# Patient Record
Sex: FEMALE | Race: WHITE
[De-identification: names, ages, dates, MRNs, and addresses within clinical notes are randomized per-mention and may not be internally consistent; named-entity substitution may affect disease eponyms.]

---

## 2022-11-16 ENCOUNTER — HOSPITAL ENCOUNTER (INPATIENT)
Dept: HOSPITAL 75 - ER | Age: 72
LOS: 6 days | Discharge: HOME | DRG: 373 | End: 2022-11-22
Attending: SURGERY | Admitting: SURGERY
Payer: MEDICARE

## 2022-11-16 VITALS — BODY MASS INDEX: 24.88 KG/M2 | WEIGHT: 145.73 LBS | HEIGHT: 63.98 IN

## 2022-11-16 VITALS — SYSTOLIC BLOOD PRESSURE: 119 MMHG | DIASTOLIC BLOOD PRESSURE: 57 MMHG

## 2022-11-16 VITALS — DIASTOLIC BLOOD PRESSURE: 62 MMHG | SYSTOLIC BLOOD PRESSURE: 135 MMHG

## 2022-11-16 VITALS — DIASTOLIC BLOOD PRESSURE: 58 MMHG | SYSTOLIC BLOOD PRESSURE: 130 MMHG

## 2022-11-16 VITALS — DIASTOLIC BLOOD PRESSURE: 56 MMHG | SYSTOLIC BLOOD PRESSURE: 124 MMHG

## 2022-11-16 VITALS — DIASTOLIC BLOOD PRESSURE: 55 MMHG | SYSTOLIC BLOOD PRESSURE: 116 MMHG

## 2022-11-16 DIAGNOSIS — Z87.891: ICD-10-CM

## 2022-11-16 DIAGNOSIS — I10: ICD-10-CM

## 2022-11-16 DIAGNOSIS — K35.33: Primary | ICD-10-CM

## 2022-11-16 DIAGNOSIS — F03.C0: ICD-10-CM

## 2022-11-16 DIAGNOSIS — E87.8: ICD-10-CM

## 2022-11-16 DIAGNOSIS — E78.5: ICD-10-CM

## 2022-11-16 DIAGNOSIS — Z20.822: ICD-10-CM

## 2022-11-16 LAB
ALBUMIN SERPL-MCNC: 4.3 GM/DL (ref 3.2–4.5)
ALP SERPL-CCNC: 54 U/L (ref 40–136)
ALT SERPL-CCNC: 14 U/L (ref 0–55)
AMYLASE SERPL-CCNC: 86 U/L (ref 25–125)
APTT BLD: 32 SEC (ref 24–35)
BASOPHILS # BLD AUTO: 0 10^3/UL (ref 0–0.1)
BASOPHILS NFR BLD AUTO: 0 % (ref 0–10)
BASOPHILS NFR BLD MANUAL: 0 %
BILIRUB SERPL-MCNC: 0.8 MG/DL (ref 0.1–1)
BUN/CREAT SERPL: 28
CALCIUM SERPL-MCNC: 10.5 MG/DL (ref 8.5–10.1)
CHLORIDE SERPL-SCNC: 98 MMOL/L (ref 98–107)
CK MB SERPL-MCNC: 3.5 NG/ML (ref ?–6.6)
CK SERPL-CCNC: 165 U/L (ref 29–168)
CO2 SERPL-SCNC: 21 MMOL/L (ref 21–32)
CREAT SERPL-MCNC: 1.37 MG/DL (ref 0.6–1.3)
D DIMER PPP FEU-MCNC: 1.27 UG/ML (ref 0–0.49)
EOSINOPHIL # BLD AUTO: 0.2 10^3/UL (ref 0–0.3)
EOSINOPHIL NFR BLD AUTO: 1 % (ref 0–10)
EOSINOPHIL NFR BLD MANUAL: 1 %
ERYTHROCYTE [SEDIMENTATION RATE] IN BLOOD: 19 MM/HR (ref 0–30)
FIBRINOGEN PPP-MCNC: 405 MG/DL (ref 221–496)
GFR SERPLBLD BASED ON 1.73 SQ M-ARVRAT: 41 ML/MIN
GLUCOSE SERPL-MCNC: 143 MG/DL (ref 70–105)
HCT VFR BLD CALC: 31 % (ref 35–52)
HGB BLD-MCNC: 10.6 G/DL (ref 11.5–16)
INR PPP: 1.2 (ref 0.8–1.4)
LIPASE SERPL-CCNC: 13 U/L (ref 8–78)
LYMPHOCYTES # BLD AUTO: 0.6 10^3/UL (ref 1–4)
LYMPHOCYTES NFR BLD AUTO: 5 % (ref 12–44)
MAGNESIUM SERPL-MCNC: 1.8 MG/DL (ref 1.6–2.4)
MANUAL DIFFERENTIAL PERFORMED BLD QL: YES
MCH RBC QN AUTO: 31 PG (ref 25–34)
MCHC RBC AUTO-ENTMCNC: 34 G/DL (ref 32–36)
MCV RBC AUTO: 91 FL (ref 80–99)
MONOCYTES # BLD AUTO: 0.3 10^3/UL (ref 0–1)
MONOCYTES NFR BLD AUTO: 3 % (ref 0–12)
MONOCYTES NFR BLD: 2 %
NEUTROPHILS # BLD AUTO: 11.1 10^3/UL (ref 1.8–7.8)
NEUTROPHILS NFR BLD AUTO: 91 % (ref 42–75)
NEUTS BAND NFR BLD MANUAL: 81 %
NEUTS BAND NFR BLD: 10 %
PLATELET # BLD: 211 10^3/UL (ref 130–400)
PMV BLD AUTO: 8.9 FL (ref 9–12.2)
POTASSIUM SERPL-SCNC: 4.3 MMOL/L (ref 3.6–5)
PROT SERPL-MCNC: 7.8 GM/DL (ref 6.4–8.2)
PROTHROMBIN TIME: 15.4 SEC (ref 12.2–14.7)
RBC MORPH BLD: NORMAL
SODIUM SERPL-SCNC: 132 MMOL/L (ref 135–145)
VARIANT LYMPHS NFR BLD MANUAL: 6 %
WBC # BLD AUTO: 12.2 10^3/UL (ref 4.3–11)

## 2022-11-16 PROCEDURE — 83735 ASSAY OF MAGNESIUM: CPT

## 2022-11-16 PROCEDURE — 85610 PROTHROMBIN TIME: CPT

## 2022-11-16 PROCEDURE — 71275 CT ANGIOGRAPHY CHEST: CPT

## 2022-11-16 PROCEDURE — 86901 BLOOD TYPING SEROLOGIC RH(D): CPT

## 2022-11-16 PROCEDURE — 84484 ASSAY OF TROPONIN QUANT: CPT

## 2022-11-16 PROCEDURE — 86850 RBC ANTIBODY SCREEN: CPT

## 2022-11-16 PROCEDURE — 82274 ASSAY TEST FOR BLOOD FECAL: CPT

## 2022-11-16 PROCEDURE — 77012 CT SCAN FOR NEEDLE BIOPSY: CPT

## 2022-11-16 PROCEDURE — 87636 SARSCOV2 & INF A&B AMP PRB: CPT

## 2022-11-16 PROCEDURE — 87076 CULTURE ANAEROBE IDENT EACH: CPT

## 2022-11-16 PROCEDURE — 80048 BASIC METABOLIC PNL TOTAL CA: CPT

## 2022-11-16 PROCEDURE — 87070 CULTURE OTHR SPECIMN AEROBIC: CPT

## 2022-11-16 PROCEDURE — 85007 BL SMEAR W/DIFF WBC COUNT: CPT

## 2022-11-16 PROCEDURE — 86920 COMPATIBILITY TEST SPIN: CPT

## 2022-11-16 PROCEDURE — 82550 ASSAY OF CK (CPK): CPT

## 2022-11-16 PROCEDURE — 99156 MOD SED OTH PHYS/QHP 5/>YRS: CPT

## 2022-11-16 PROCEDURE — 71045 X-RAY EXAM CHEST 1 VIEW: CPT

## 2022-11-16 PROCEDURE — 93041 RHYTHM ECG TRACING: CPT

## 2022-11-16 PROCEDURE — 85384 FIBRINOGEN ACTIVITY: CPT

## 2022-11-16 PROCEDURE — 87205 SMEAR GRAM STAIN: CPT

## 2022-11-16 PROCEDURE — 87075 CULTR BACTERIA EXCEPT BLOOD: CPT

## 2022-11-16 PROCEDURE — 87077 CULTURE AEROBIC IDENTIFY: CPT

## 2022-11-16 PROCEDURE — 74178 CT ABD&PLV WO CNTR FLWD CNTR: CPT

## 2022-11-16 PROCEDURE — 85027 COMPLETE CBC AUTOMATED: CPT

## 2022-11-16 PROCEDURE — 85652 RBC SED RATE AUTOMATED: CPT

## 2022-11-16 PROCEDURE — 80053 COMPREHEN METABOLIC PANEL: CPT

## 2022-11-16 PROCEDURE — 85379 FIBRIN DEGRADATION QUANT: CPT

## 2022-11-16 PROCEDURE — 87116 MYCOBACTERIA CULTURE: CPT

## 2022-11-16 PROCEDURE — 93005 ELECTROCARDIOGRAM TRACING: CPT

## 2022-11-16 PROCEDURE — 87206 SMEAR FLUORESCENT/ACID STAI: CPT

## 2022-11-16 PROCEDURE — 86141 C-REACTIVE PROTEIN HS: CPT

## 2022-11-16 PROCEDURE — 74174 CTA ABD&PLVS W/CONTRAST: CPT

## 2022-11-16 PROCEDURE — 36415 COLL VENOUS BLD VENIPUNCTURE: CPT

## 2022-11-16 PROCEDURE — 83690 ASSAY OF LIPASE: CPT

## 2022-11-16 PROCEDURE — 85730 THROMBOPLASTIN TIME PARTIAL: CPT

## 2022-11-16 PROCEDURE — 82150 ASSAY OF AMYLASE: CPT

## 2022-11-16 PROCEDURE — 82553 CREATINE MB FRACTION: CPT

## 2022-11-16 PROCEDURE — 85025 COMPLETE CBC W/AUTO DIFF WBC: CPT

## 2022-11-16 PROCEDURE — 86900 BLOOD TYPING SEROLOGIC ABO: CPT

## 2022-11-16 RX ADMIN — SODIUM CHLORIDE SCH MLS/HR: 900 INJECTION INTRAVENOUS at 17:08

## 2022-11-16 RX ADMIN — MORPHINE SULFATE PRN MG: 4 INJECTION, SOLUTION INTRAMUSCULAR; INTRAVENOUS at 15:55

## 2022-11-16 NOTE — DIAGNOSTIC IMAGING REPORT
PROCEDURE: CT angiography of the chest with contrast and CT

abdomen and pelvis with contrast.



TECHNIQUE: Multiple contiguous axial images were obtained through

the chest, abdomen and pelvis after administration of intravenous

contrast. 3D MIP reconstructed CT  angiography acquisitions of

the aorta were then performed. Auto Exposure Controls were

utilized during the CT exam to meet ALARA standards for radiation

dose reduction. 



INDICATION:  Abdominal pain.



No prior studies are available for comparison.



CT angiogram chest:



The thoracic aorta is of normal caliber. No dissection is

identified. Central pulmonary arteries are patent. Is no

pericardial or pleural fluid identified. Biapical

pleural-parenchymal scarring is noted. There are centrilobular

emphysematous changes. No infiltrates or pulmonary masses are

detected. There is a mass right lobe of the thyroid approximately

2.5 cm in size.



IMPRESSION: 

Chronic parenchymal changes. No acute abnormality in the chest is

identified. There is no evidence of a thoracic aortic dissection.





Thyroid mass. Dedicated thyroid ultrasound on a nonemergent basis

could be performed for further characterization.



CT abdomen and pelvis:



No focal liver mass is detected. Gallbladder is unremarkable.

There is no biliary duct dilatation. Pancreas and spleen are

unremarkable. No adrenal mass is identified. Right kidneys

unremarkable. Left kidney contains a cyst in the upper pole

measuring 4.3 cm. Bowel loops are normal caliber. The appendix is

dilated and thick-walled with periappendiceal inflammation

consistent with acute appendicitis. There is some small amount of

perihepatic free fluid. There appears to be some ill-defined

fluid in the right lower quadrant and features are concerning for

a perforated appendicitis. No drainable abscess is identified at

this time. There is no free air. Bladder and uterus are

unremarkable.



IMPRESSION: Features consistent with acute appendicitis. There is

some free fluid in the right lower quadrant and features are

concerning for perforated appendicitis. No bowel obstruction is

identified. 



Moderate stool consistent with constipation.



Left renal cyst.



Dictated by: 



  Dictated on workstation # [id number]
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
## 2022-11-16 NOTE — CONSULTATION REPORT
HISTORY OF PRESENT ILLNESS:

The patient is a 72-year-old female who was brought to the Emergency Department 

by her  for abdominal pain.  The patient does have a history of dementia;

however, the patient's  states that she is a relatively functional.  She 

can communicate at times she is able to ambulate on her own and goes to the 

kitchen as well as a bathroom on her own.  She also is able to eat without any 

difficulty.  They report that she developed lower abdominal pain a few weeks 

ago.  She underwent workup at a different institution including a CT scan and 

laboratory work, which did show what he believes was colitis and was placed on 

antibiotics at home.  They state that the pain did resolve over time; however, 

she had recurrent lower crampy abdominal pain starting yesterday, they report 

that this was slightly more intense in quality.  They do not report any nausea 

or vomiting as well as no diarrhea.  Again, upon further questioning, the 

 reports that she has had this pain in the lower abdominal quadrants now 

for approximately 2 weeks.  A CT scan was performed, which did show a perforated

appendicitis.  This is likely a chronic process.  She is afebrile with stable 

vital signs and her white count is 12,000.  She is most likely in the organizing

phase of an inflammatory phlegmon.



PAST MEDICAL HISTORY:

Dementia, hypertension, hypercholesterolemia.



PAST SURGICAL HISTORY:

Breast biopsy.



ALLERGIES:

NO KNOWN DRUG ALLERGIES.



MEDICATIONS:

Donepezil 5 mg daily, lisinopril/hydrochlorothiazide 20/12.5 mg b.i.d., 

spironolactone 25 mg daily.



SOCIAL HISTORY:

Previous smoker, quit 1991, 30 pack years.  Social alcohol, quit same year.



FAMILY HISTORY:

Brother lymphoma.  Another brother with esophageal cancer.



VITAL SIGNS:

Temperature 37.0, blood pressure 135/62, pulse 88, respirations 16, pulse ox 

100% on 1 liter nasal cannula.



REVIEW OF SYSTEMS:

GENERAL:  This is an elderly female who is currently sleeping and does not 

appear to be in any acute distress.  She is not experiencing any shortness of 

breath or difficulty breathing.

RESPIRATORY:  No cough or sputum production.  No chest pain.

CARDIAC:  No palpitations, no diaphoresis.

GASTROINTESTINAL:  No nausea, vomiting with slightly looser stools, no known red

blood per rectum, nor any dark tarry stools.  No fever or chills.  No recent 

inadvertent weight loss.  All other review of systems negative.



PHYSICAL EXAMINATION:

CHEST:  Clear.  Good breath sounds bilaterally.

HEART:  Regular, no murmurs.

EXTREMITIES:  No lower extremity edema.  Negative Homans sign.

HEENT:  No scleral icterus.  No cervical lymphadenopathy.

ABDOMEN:  Soft, nondistended.  There is pain in the right lower abdominal 

quadrant with voluntary guarding, no rebound.

SKIN:  Warm and dry.



LABORATORY DATA:  WBC 12.2, hemoglobin 10.6, hematocrit 31, platelets 211, BUN 

38, creatinine 1.37.  Liver function enzymes normal.  Amylase and lipase normal.



ASSESSMENT AND PLAN:

A 72-year-old female with a perforated appendicitis.  This appears to be chronic

and now in the organizing phase of an inflammatory phlegmon.  Due to the stage 

of this perforated appendicitis, our recommendation is to proceed with 

conservative management with IV antibiotics and monitoring her vital signs.  She

may need a repeat CT scan to see if any drainable abscess does develop.  Once 
she 

is feeling better and the pain has subsided and she is ambulating well, 

tolerating a diet and having normal bowel movements, she will then be discharged

home with oral antibiotics and then we will have her up and in approximately 6 

weeks, we would then proceed with a formal appendectomy to prevent reoccurrence 

of this issue.  For now, we will start a diet and advanced as tolerated and 

continue with the IV antibiotics and repeat labs in the a.m.





Job ID: [id number]

DocumentID: [id number]

Dictated Date: 11/16/2022 17:57:14

Transcription Date: 11/16/2022 18:53:00

Dictated By: SHANI GUO MD

MTDD

## 2022-11-16 NOTE — DIAGNOSTIC IMAGING REPORT
EXAMINATION: Chest radiograph, portable AP view.



DATE: 11/16/2022 4:35 AM



INDICATION: 72-year-old female, chest pain.



COMPARISON:  None.



FINDINGS: Heart size and mediastinal contours are unremarkable.

There is no identified pneumothorax. There is no large pleural

effusion. There is no identified focal airspace consolidation.



IMPRESSION: 

1. No identified acute cardiopulmonary abnormality.



Dictated by: 



  Dictated on workstation # [id number]

## 2022-11-17 VITALS — SYSTOLIC BLOOD PRESSURE: 152 MMHG | DIASTOLIC BLOOD PRESSURE: 65 MMHG

## 2022-11-17 VITALS — DIASTOLIC BLOOD PRESSURE: 70 MMHG | SYSTOLIC BLOOD PRESSURE: 164 MMHG

## 2022-11-17 VITALS — DIASTOLIC BLOOD PRESSURE: 64 MMHG | SYSTOLIC BLOOD PRESSURE: 152 MMHG

## 2022-11-17 VITALS — DIASTOLIC BLOOD PRESSURE: 68 MMHG | SYSTOLIC BLOOD PRESSURE: 148 MMHG

## 2022-11-17 VITALS — SYSTOLIC BLOOD PRESSURE: 148 MMHG | DIASTOLIC BLOOD PRESSURE: 67 MMHG

## 2022-11-17 LAB
BASOPHILS # BLD AUTO: 0 10^3/UL (ref 0–0.1)
BASOPHILS NFR BLD AUTO: 0 % (ref 0–10)
BUN/CREAT SERPL: 22
CALCIUM SERPL-MCNC: 9.9 MG/DL (ref 8.5–10.1)
CHLORIDE SERPL-SCNC: 105 MMOL/L (ref 98–107)
CO2 SERPL-SCNC: 21 MMOL/L (ref 21–32)
CREAT SERPL-MCNC: 0.97 MG/DL (ref 0.6–1.3)
EOSINOPHIL # BLD AUTO: 0.1 10^3/UL (ref 0–0.3)
EOSINOPHIL NFR BLD AUTO: 1 % (ref 0–10)
GFR SERPLBLD BASED ON 1.73 SQ M-ARVRAT: 61 ML/MIN
GLUCOSE SERPL-MCNC: 125 MG/DL (ref 70–105)
HCT VFR BLD CALC: 30 % (ref 35–52)
HGB BLD-MCNC: 10.1 G/DL (ref 11.5–16)
LYMPHOCYTES # BLD AUTO: 0.8 10^3/UL (ref 1–4)
LYMPHOCYTES NFR BLD AUTO: 6 % (ref 12–44)
MANUAL DIFFERENTIAL PERFORMED BLD QL: NO
MCH RBC QN AUTO: 31 PG (ref 25–34)
MCHC RBC AUTO-ENTMCNC: 34 G/DL (ref 32–36)
MCV RBC AUTO: 91 FL (ref 80–99)
MONOCYTES # BLD AUTO: 0.5 10^3/UL (ref 0–1)
MONOCYTES NFR BLD AUTO: 4 % (ref 0–12)
NEUTROPHILS # BLD AUTO: 12.7 10^3/UL (ref 1.8–7.8)
NEUTROPHILS NFR BLD AUTO: 89 % (ref 42–75)
PLATELET # BLD: 197 10^3/UL (ref 130–400)
PMV BLD AUTO: 8.9 FL (ref 9–12.2)
POTASSIUM SERPL-SCNC: 3.7 MMOL/L (ref 3.6–5)
SODIUM SERPL-SCNC: 136 MMOL/L (ref 135–145)
WBC # BLD AUTO: 14.2 10^3/UL (ref 4.3–11)

## 2022-11-17 RX ADMIN — MORPHINE SULFATE PRN MG: 4 INJECTION, SOLUTION INTRAMUSCULAR; INTRAVENOUS at 20:22

## 2022-11-17 RX ADMIN — MORPHINE SULFATE PRN MG: 4 INJECTION, SOLUTION INTRAMUSCULAR; INTRAVENOUS at 16:51

## 2022-11-17 RX ADMIN — ENOXAPARIN SODIUM SCH MG: 100 INJECTION SUBCUTANEOUS at 15:03

## 2022-11-17 RX ADMIN — SODIUM CHLORIDE SCH MLS/HR: 900 INJECTION INTRAVENOUS at 17:43

## 2022-11-17 RX ADMIN — MORPHINE SULFATE PRN MG: 4 INJECTION, SOLUTION INTRAMUSCULAR; INTRAVENOUS at 12:15

## 2022-11-17 RX ADMIN — SODIUM CHLORIDE SCH MLS/HR: 900 INJECTION INTRAVENOUS at 09:08

## 2022-11-17 RX ADMIN — SODIUM CHLORIDE SCH MLS/HR: 4.5 INJECTION, SOLUTION INTRAVENOUS at 15:02

## 2022-11-17 RX ADMIN — SODIUM CHLORIDE SCH MLS/HR: 900 INJECTION INTRAVENOUS at 02:29

## 2022-11-17 NOTE — PROGRESS NOTE
Subjective


Date Seen by a Provider:  Nov 17, 2022


Time Seen by a Provider:  16:30


Subjective/Events-last exam


doing ok. tolerating liquids. pain controlled. no fever/chills.





Objective


Exam





Vital Signs








  Date Time  Temp Pulse Resp B/P (MAP) Pulse Ox O2 Delivery O2 Flow Rate FiO2


 


11/17/22 15:29 37.1 95 18 148/68 (94) 100 Room Air  


 


11/17/22 12:00 36.9 97 18 152/64 (93) 97   


 


11/17/22 08:00      Room Air  


 


11/17/22 08:00 37.5 99 18 148/67 (94)    


 


11/17/22 04:16 36.8 96 18 164/70 (101) 95 Room Air  


 


11/16/22 23:35 36.9 81 18 119/57 (77) 99 Room Air  


 


11/16/22 20:59      Room Air  


 


11/16/22 19:22 36.9 84 20 116/55 (75) 99 Nasal Cannula 1.00 














I & O 


 


 11/17/22





 07:00


 


Intake Total 0 ml


 


Balance 0 ml





Capillary Refill : Less Than 3 Seconds


General Appearance:  No Apparent Distress


HEENT:  PERRL/EOMI


Neck:  Full Range of Motion


Respiratory:  Chest Non Tender, Lungs Clear


Cardiovascular:  Regular Rate, Rhythm


Gastrointestinal:  normal bowel sounds, soft, tenderness


Extremity:  Normal Capillary Refill


Neurologic/Psychiatric:  Alert


Skin:  Normal Color


Lymphatic:  No Adenopathy





Results


Lab


Laboratory Tests


11/17/22 07:57: 


White Blood Count 14.2H, Red Blood Count 3.28L, Hemoglobin 10.1L, Hematocrit 30L

, Mean Corpuscular Volume 91, Mean Corpuscular Hemoglobin 31, Mean Corpuscular 

Hemoglobin Concent 34, Red Cell Distribution Width 13.9, Platelet Count 197, 

Mean Platelet Volume 8.9L, Immature Granulocyte % (Auto) 1, Neutrophils (%) 

(Auto) 89H, Lymphocytes (%) (Auto) 6L, Monocytes (%) (Auto) 4, Eosinophils (%) 

(Auto) 1, Basophils (%) (Auto) 0, Neutrophils # (Auto) 12.7H, Lymphocytes # 

(Auto) 0.8L, Monocytes # (Auto) 0.5, Eosinophils # (Auto) 0.1, Basophils # 

(Auto) 0.0, Immature Granulocyte # (Auto) 0.1, Sodium Level 136, Potassium Level

3.7, Chloride Level 105, Carbon Dioxide Level 21, Anion Gap 10, Blood Urea 

Nitrogen 21H, Creatinine 0.97, Estimat Glomerular Filtration Rate 61, 

BUN/Creatinine Ratio 22, Glucose Level 125H, Calcium Level 9.9





Assessment/Plan


Assessment/Plan


Assess & Plan/Chief Complaint


perforated appendicitis-chronic and organizing phase.


literature now recommends bowel rest and IV abx due to increased 

risk/complications with surgery.


will continue with conservative management with frequent clinical exams and 

labs.











SHANI GUO MD                Nov 17, 2022 17:00

## 2022-11-17 NOTE — PROGRESS NOTE - HOSPITALIST
Subjective


HPI/CC On Admission


Date Seen by Provider:  Nov 17, 2022


Pt is a 72yoCF with a PMH of advanced dementia, HTN, and HLD who presented to 

the ER due to abd pain. She is sleeping soundly after having received morphine 

and does not provide any history due to that and her advanced dementia.  

provides all of the history. He reports that she developed abd pain around 1800 

last night and was groaning. She also had poor oral intake yesterday. She had a 

similar episode back in August but was told it was her colon at that time and 

prescribed Flagyl and Cipro as an outpatient. Imaging today revealed acute 

appendicitis. She is admitted to surgery and I am consulted for medical 

management.


Subjective/Events-last exam


Pt more alert today. Still does not provide history.  states she's had 

onthing orally and is not interested in eating. Asks about he sodium level and 

what would be indicatoins for surgery. Deferred surgical deicsion making 

questions to Dr Osborne and informed patient of that. Discussed labs results and 

plan to start IVF with low oral intake. RN reports she pulled her IV out 

overnight.





Objective


Exam


Vital Signs





Vital Signs








  Date Time  Temp Pulse Resp B/P (MAP) Pulse Ox O2 Delivery O2 Flow Rate FiO2


 


11/17/22 12:00 36.9 97 18 152/64 (93) 97   


 


11/17/22 08:00      Room Air  


 


11/16/22 19:22       1.00 





Capillary Refill : Less Than 3 Seconds


General Appearance:  WD/WN, Chronically ill


Cardiovascular:  Regular Rate, Rhythm, No Murmur


Gastrointestinal:  Normal Bowel Sounds; No Distended; Tenderness (improved but 

still on right side with palpation)


Neurologic/Psychiatric:  Alert





Results/Procedures


Lab


Laboratory Tests


11/17/22 07:57








Patient resulted labs reviewed.


Imaging:  Reviewed Imaging Report





Assessment/Plan


Assessment and Plan


Assess & Plan/Chief Complaint


Acute appendicitis with questionable perforation


Advanced Dementia


   Management per surgery- note states plan to defer surgery for a few weeks and

treat with IV abx


   Pain regimen


   IVF ordered





HTN


HLD


   BP trended up, resume lisinopril


   trend


   No acute needs





DVT ppx: Lovenox











ROVERTO FIORE MD              Nov 17, 2022 14:37

## 2022-11-18 VITALS — DIASTOLIC BLOOD PRESSURE: 90 MMHG | SYSTOLIC BLOOD PRESSURE: 155 MMHG

## 2022-11-18 VITALS — SYSTOLIC BLOOD PRESSURE: 181 MMHG | DIASTOLIC BLOOD PRESSURE: 74 MMHG

## 2022-11-18 VITALS — SYSTOLIC BLOOD PRESSURE: 166 MMHG | DIASTOLIC BLOOD PRESSURE: 72 MMHG

## 2022-11-18 VITALS — DIASTOLIC BLOOD PRESSURE: 81 MMHG | SYSTOLIC BLOOD PRESSURE: 184 MMHG

## 2022-11-18 VITALS — SYSTOLIC BLOOD PRESSURE: 166 MMHG | DIASTOLIC BLOOD PRESSURE: 80 MMHG

## 2022-11-18 VITALS — DIASTOLIC BLOOD PRESSURE: 75 MMHG | SYSTOLIC BLOOD PRESSURE: 182 MMHG

## 2022-11-18 VITALS — SYSTOLIC BLOOD PRESSURE: 122 MMHG | DIASTOLIC BLOOD PRESSURE: 65 MMHG

## 2022-11-18 LAB
BUN/CREAT SERPL: 21
CALCIUM SERPL-MCNC: 9.2 MG/DL (ref 8.5–10.1)
CHLORIDE SERPL-SCNC: 105 MMOL/L (ref 98–107)
CO2 SERPL-SCNC: 19 MMOL/L (ref 21–32)
CREAT SERPL-MCNC: 0.86 MG/DL (ref 0.6–1.3)
GFR SERPLBLD BASED ON 1.73 SQ M-ARVRAT: 71 ML/MIN
GLUCOSE SERPL-MCNC: 108 MG/DL (ref 70–105)
HCT VFR BLD CALC: 28 % (ref 35–52)
HGB BLD-MCNC: 9.4 G/DL (ref 11.5–16)
MCH RBC QN AUTO: 31 PG (ref 25–34)
MCHC RBC AUTO-ENTMCNC: 34 G/DL (ref 32–36)
MCV RBC AUTO: 91 FL (ref 80–99)
PLATELET # BLD: 214 10^3/UL (ref 130–400)
PMV BLD AUTO: 9.3 FL (ref 9–12.2)
POTASSIUM SERPL-SCNC: 3.5 MMOL/L (ref 3.6–5)
SODIUM SERPL-SCNC: 135 MMOL/L (ref 135–145)
WBC # BLD AUTO: 13.6 10^3/UL (ref 4.3–11)

## 2022-11-18 RX ADMIN — SODIUM CHLORIDE SCH MLS/HR: 4.5 INJECTION, SOLUTION INTRAVENOUS at 20:17

## 2022-11-18 RX ADMIN — LISINOPRIL SCH MG: 20 TABLET ORAL at 05:25

## 2022-11-18 RX ADMIN — SPIRONOLACTONE SCH MG: 25 TABLET ORAL at 08:19

## 2022-11-18 RX ADMIN — SODIUM CHLORIDE SCH MLS/HR: 900 INJECTION INTRAVENOUS at 09:05

## 2022-11-18 RX ADMIN — DONEPEZIL HYDROCHLORIDE SCH MG: 5 TABLET, FILM COATED ORAL at 08:19

## 2022-11-18 RX ADMIN — SODIUM CHLORIDE SCH MLS/HR: 900 INJECTION INTRAVENOUS at 17:14

## 2022-11-18 RX ADMIN — MORPHINE SULFATE PRN MG: 4 INJECTION, SOLUTION INTRAMUSCULAR; INTRAVENOUS at 04:50

## 2022-11-18 RX ADMIN — SODIUM CHLORIDE SCH MLS/HR: 4.5 INJECTION, SOLUTION INTRAVENOUS at 10:46

## 2022-11-18 RX ADMIN — HYDROCODONE BITARTRATE AND ACETAMINOPHEN PRN EA: 5; 325 TABLET ORAL at 17:14

## 2022-11-18 RX ADMIN — ENOXAPARIN SODIUM SCH MG: 100 INJECTION SUBCUTANEOUS at 15:25

## 2022-11-18 RX ADMIN — SODIUM CHLORIDE SCH MLS/HR: 4.5 INJECTION, SOLUTION INTRAVENOUS at 00:41

## 2022-11-18 RX ADMIN — SODIUM CHLORIDE SCH MLS/HR: 900 INJECTION INTRAVENOUS at 01:42

## 2022-11-18 RX ADMIN — LISINOPRIL SCH MG: 20 TABLET ORAL at 20:17

## 2022-11-18 NOTE — PHYSICAL THERAPY PROGRESS NOTE
Therapy Progress Note


Order received for PT evaluation.  Patient is currently will not wake up.  

 cannot wake her.  Patient has advanced dementia.   says she is 

usually more awake in the morning.  Will check back tomorrow morning.











DYLON MAZARIEGOS PT                Nov 18, 2022 13:32

## 2022-11-18 NOTE — PROGRESS NOTE - HOSPITALIST
Subjective


HPI/CC On Admission


Date Seen by Provider:  Nov 18, 2022


Pt is a 72yoCF with a PMH of advanced dementia, HTN, and HLD who presented to 

the ER due to abd pain. She is sleeping soundly after having received morphine 

and does not provide any history due to that and her advanced dementia.  

provides all of the history. He reports that she developed abd pain around 1800 

last night and was groaning. She also had poor oral intake yesterday. She had a 

similar episode back in August but was told it was her colon at that time and 

prescribed Flagyl and Cipro as an outpatient. Imaging today revealed acute 

appendicitis. She is admitted to surgery and I am consulted for medical 

management.


Subjective/Events-last exam


Pt remains drowsy but  states her mentation is much improved. He states 

she looked at him and seemed to smile and light up this morning. She also ate a 

big breakfast. He seems to think her pain is improved as well.





Objective


Exam


Vital Signs





Vital Signs








  Date Time  Temp Pulse Resp B/P (MAP) Pulse Ox O2 Delivery O2 Flow Rate FiO2


 


11/18/22 11:38 36.4 76 18 122/65 (84) 99 Room Air 0.00 





       0.00 





Capillary Refill : Less Than 3 Seconds


General Appearance:  No Apparent Distress, Chronically ill, Thin


Respiratory:  Lungs Clear, No Respiratory Distress


Cardiovascular:  Regular Rate, Rhythm, No Murmur


Gastrointestinal:  Normal Bowel Sounds, Non Tender, Soft


Neurologic/Psychiatric:  Alert, Disoriented





Results/Procedures


Lab


Laboratory Tests


11/18/22 05:21








Patient resulted labs reviewed.


Imaging:  Reviewed Imaging Report





Assessment/Plan


Assessment and Plan


Assess & Plan/Chief Complaint


Acute appendicitis with questionable perforation


Advanced Dementia


   Management per surgery- note states plan to defer surgery for a few weeks and

treat with IV abx


   Pain improving


   IVF ordered- if continues to eat well will DC later





HTN


HLD


   BP trended up, continue lisinopril


   trend


   No acute needs





DVT ppx: ROVERTO Leong MD              Nov 18, 2022 12:07

## 2022-11-18 NOTE — PROGRESS NOTE
Subjective


Date Seen by a Provider:  Nov 18, 2022


Time Seen by a Provider:  15:00


Subjective/Events-last exam


doing ok. pain controlled. tolerating clears. had a BM. no fever/chills.





Objective


Exam





Vital Signs








  Date Time  Temp Pulse Resp B/P (MAP) Pulse Ox O2 Delivery O2 Flow Rate FiO2


 


11/18/22 11:38 36.4 76 18 122/65 (84) 99 Room Air 0.00 





       0.00 


 


11/18/22 08:00      Room Air  


 


11/18/22 07:32 37.2 93 20 155/90 (111) 95 Room Air 0.00 





       0.00 


 


11/18/22 04:58 36.4 92 18 181/74 (109) 95 Room Air  


 


11/18/22 00:00 36.7 88 18 166/72 (103) 96 Room Air  


 


11/17/22 20:00      Room Air  


 


11/17/22 19:05 37.3 86 20 152/65 (94) 97 Room Air 0.00 


 


11/17/22 15:29 37.1 95 18 148/68 (94) 100 Room Air  














I & O 


 


 11/18/22





 07:00


 


Intake Total 255 ml


 


Balance 255 ml





Capillary Refill : Less Than 3 Seconds


General Appearance:  No Apparent Distress


HEENT:  PERRL/EOMI


Neck:  Full Range of Motion


Respiratory:  Chest Non Tender, Lungs Clear


Cardiovascular:  Regular Rate, Rhythm


Gastrointestinal:  normal bowel sounds, soft, tenderness


Extremity:  Normal Capillary Refill


Neurologic/Psychiatric:  Alert


Skin:  Normal Color


Lymphatic:  No Adenopathy





Results


Lab


Laboratory Tests


11/18/22 05:21: 


White Blood Count 13.6H, Red Blood Count 3.06L, Hemoglobin 9.4L, Hematocrit 28L,

Mean Corpuscular Volume 91, Mean Corpuscular Hemoglobin 31, Mean Corpuscular 

Hemoglobin Concent 34, Red Cell Distribution Width 13.7, Platelet Count 214, 

Mean Platelet Volume 9.3, Percent Immature Platelet Fraction 1.5, Sodium Level 

135, Potassium Level 3.5L, Chloride Level 105, Carbon Dioxide Level 19L, Anion 

Gap 11, Blood Urea Nitrogen 18, Creatinine 0.86, Estimat Glomerular Filtration 

Rate 71, BUN/Creatinine Ratio 21, Glucose Level 108H, Calcium Level 9.2





Assessment/Plan


Assessment/Plan


Assess & Plan/Chief Complaint


perforated appendicitis-chronic and organizing phase.


literature now recommends bowel rest and IV abx due to increased 

risk/complications with surgery.


will continue with conservative management with frequent clinical exams and 

labs.


will cont current management over weekend and get CT abd/pelvis on monday and if

no abscess and clinically doing well overall will d/c home.











SHANI GUO MD                Nov 18, 2022 15:17

## 2022-11-19 VITALS — SYSTOLIC BLOOD PRESSURE: 167 MMHG | DIASTOLIC BLOOD PRESSURE: 77 MMHG

## 2022-11-19 VITALS — SYSTOLIC BLOOD PRESSURE: 184 MMHG | DIASTOLIC BLOOD PRESSURE: 79 MMHG

## 2022-11-19 VITALS — SYSTOLIC BLOOD PRESSURE: 180 MMHG | DIASTOLIC BLOOD PRESSURE: 88 MMHG

## 2022-11-19 VITALS — SYSTOLIC BLOOD PRESSURE: 162 MMHG | DIASTOLIC BLOOD PRESSURE: 70 MMHG

## 2022-11-19 VITALS — SYSTOLIC BLOOD PRESSURE: 182 MMHG | DIASTOLIC BLOOD PRESSURE: 70 MMHG

## 2022-11-19 VITALS — SYSTOLIC BLOOD PRESSURE: 142 MMHG | DIASTOLIC BLOOD PRESSURE: 72 MMHG

## 2022-11-19 VITALS — SYSTOLIC BLOOD PRESSURE: 200 MMHG | DIASTOLIC BLOOD PRESSURE: 88 MMHG

## 2022-11-19 VITALS — SYSTOLIC BLOOD PRESSURE: 210 MMHG | DIASTOLIC BLOOD PRESSURE: 99 MMHG

## 2022-11-19 LAB
BASOPHILS # BLD AUTO: 0.1 10^3/UL (ref 0–0.1)
BASOPHILS NFR BLD AUTO: 0 % (ref 0–10)
EOSINOPHIL # BLD AUTO: 0.5 10^3/UL (ref 0–0.3)
EOSINOPHIL NFR BLD AUTO: 4 % (ref 0–10)
HCT VFR BLD CALC: 28 % (ref 35–52)
HGB BLD-MCNC: 9.4 G/DL (ref 11.5–16)
LYMPHOCYTES # BLD AUTO: 1.2 10^3/UL (ref 1–4)
LYMPHOCYTES NFR BLD AUTO: 10 % (ref 12–44)
MANUAL DIFFERENTIAL PERFORMED BLD QL: NO
MCH RBC QN AUTO: 31 PG (ref 25–34)
MCHC RBC AUTO-ENTMCNC: 34 G/DL (ref 32–36)
MCV RBC AUTO: 91 FL (ref 80–99)
MONOCYTES # BLD AUTO: 0.6 10^3/UL (ref 0–1)
MONOCYTES NFR BLD AUTO: 5 % (ref 0–12)
NEUTROPHILS # BLD AUTO: 9.5 10^3/UL (ref 1.8–7.8)
NEUTROPHILS NFR BLD AUTO: 80 % (ref 42–75)
PLATELET # BLD: 218 10^3/UL (ref 130–400)
PMV BLD AUTO: 9.4 FL (ref 9–12.2)
WBC # BLD AUTO: 11.9 10^3/UL (ref 4.3–11)

## 2022-11-19 RX ADMIN — SODIUM CHLORIDE SCH MLS/HR: 900 INJECTION INTRAVENOUS at 01:21

## 2022-11-19 RX ADMIN — MORPHINE SULFATE PRN MG: 4 INJECTION, SOLUTION INTRAMUSCULAR; INTRAVENOUS at 16:02

## 2022-11-19 RX ADMIN — HYDROCODONE BITARTRATE AND ACETAMINOPHEN PRN EA: 5; 325 TABLET ORAL at 20:15

## 2022-11-19 RX ADMIN — SPIRONOLACTONE SCH MG: 25 TABLET ORAL at 08:17

## 2022-11-19 RX ADMIN — SODIUM CHLORIDE SCH MLS/HR: 4.5 INJECTION, SOLUTION INTRAVENOUS at 06:12

## 2022-11-19 RX ADMIN — MORPHINE SULFATE PRN MG: 4 INJECTION, SOLUTION INTRAMUSCULAR; INTRAVENOUS at 01:20

## 2022-11-19 RX ADMIN — SODIUM CHLORIDE SCH MLS/HR: 900 INJECTION INTRAVENOUS at 10:42

## 2022-11-19 RX ADMIN — DONEPEZIL HYDROCHLORIDE SCH MG: 5 TABLET, FILM COATED ORAL at 08:16

## 2022-11-19 RX ADMIN — ENOXAPARIN SODIUM SCH MG: 100 INJECTION SUBCUTANEOUS at 16:02

## 2022-11-19 RX ADMIN — MORPHINE SULFATE PRN MG: 4 INJECTION, SOLUTION INTRAMUSCULAR; INTRAVENOUS at 11:11

## 2022-11-19 RX ADMIN — HYDRALAZINE HYDROCHLORIDE PRN MG: 20 INJECTION INTRAMUSCULAR; INTRAVENOUS at 10:42

## 2022-11-19 RX ADMIN — LISINOPRIL SCH MG: 20 TABLET ORAL at 08:16

## 2022-11-19 RX ADMIN — LISINOPRIL SCH MG: 20 TABLET ORAL at 20:12

## 2022-11-19 RX ADMIN — MORPHINE SULFATE PRN MG: 4 INJECTION, SOLUTION INTRAMUSCULAR; INTRAVENOUS at 14:35

## 2022-11-19 RX ADMIN — SODIUM CHLORIDE SCH MLS/HR: 900 INJECTION INTRAVENOUS at 17:10

## 2022-11-19 NOTE — PROGRESS NOTE - HOSPITALIST
Subjective


HPI/CC On Admission


Date Seen by Provider:  Nov 19, 2022


Pt is a 72yoCF with a PMH of advanced dementia, HTN, and HLD who presented to 

the ER due to abd pain. She is sleeping soundly after having received morphine 

and does not provide any history due to that and her advanced dementia.  

provides all of the history. He reports that she developed abd pain around 1800 

last night and was groaning. She also had poor oral intake yesterday. She had a 

similar episode back in August but was told it was her colon at that time and 

prescribed Flagyl and Cipro as an outpatient. Imaging today revealed acute 

appendicitis. She is admitted to surgery and I am consulted for medical 

management.


Subjective/Events-last exam


Pt much more alert today. Sitting up in chair. Ate breakfast well. Had elevated 

blood pressure this AM but  thinks it was due to pain.





Objective


Exam


Vital Signs





Vital Signs








  Date Time  Temp Pulse Resp B/P (MAP) Pulse Ox O2 Delivery O2 Flow Rate FiO2


 


11/19/22 09:14    182/70 (107)    


 


11/19/22 08:00     97 Room Air 0.00 


 


11/19/22 07:26 36.6 89 18     





Capillary Refill : Less Than 3 Seconds


General Appearance:  No Apparent Distress, Chronically ill


Respiratory:  Lungs Clear, No Respiratory Distress


Cardiovascular:  Regular Rate, Rhythm, No Murmur


Neurologic/Psychiatric:  Alert, Oriented x3





Results/Procedures


Lab


Laboratory Tests


11/19/22 05:38








Patient resulted labs reviewed.


Imaging:  Reviewed Imaging Report





Assessment/Plan


Assessment and Plan


Assess & Plan/Chief Complaint


Acute appendicitis with questionable perforation


Advanced Dementia


   Management per surgery- note states plan to defer surgery for a few weeks and

treat with IV abx


   Pain improving


   IVF to DC today after bag finishes


   Add Miralax





HTN


HLD


   BP trended up more this morning


      Will add amlodipine to regimen


   trend


   Hydralazine prn





DVT ppx: Lovenox











ROVERTO FIORE MD             Nov 19, 2022 9:49 am

## 2022-11-19 NOTE — PROGRESS NOTE - SURGERY
RENITA KURTZ 11/19/22 0748:


Subjective


Date Seen by a Provider:  Nov 19, 2022


Time Seen by a Provider:  07:42


Subjective/Events-last exam


Patient is laying in bed without discomfort, but drowsy 


Patients  is at bedside and helps with report due to patients dementia, 

which  thinks is improving


Pain is currently well managed per 


Eating well


Urinating without difficulty


Denies fever or chills





Objective


Exam





Vital Signs








  Date Time  Temp Pulse Resp B/P (MAP) Pulse Ox O2 Delivery O2 Flow Rate FiO2


 


11/19/22 07:39    200/88 (125)    


 


11/19/22 07:26 36.6 89 18 210/99 (136) 97 Room Air 0.00 





       0.00 


 


11/19/22 03:40 37.1 84 16 162/70 (100) 97 Room Air  


 


11/19/22 01:50 37.3       


 


11/19/22 00:00 37.3 76 16 167/77 (107) 96 Room Air  


 


11/18/22 20:11 37.5 76 19 182/75 (110) 98 Room Air  


 


11/18/22 20:00      Room Air  


 


11/18/22 16:19  85  166/80 (108)    


 


11/18/22 15:33 36.5 90 20 184/81 (115)  Room Air  


 


11/18/22 11:38 36.4 76 18 122/65 (84) 99 Room Air 0.00 





       0.00 


 


11/18/22 08:00      Room Air  














I & O 


 


 11/19/22





 07:00


 


Intake Total 1450 ml


 


Balance 1450 ml





Capillary Refill : Less Than 3 Seconds


General Appearance:  No Apparent Distress, WD/WN


HEENT:  PERRL/EOMI; No Pale Conjunctivae (L), No Scleral Icterus (L), No Scleral

Icterus (R)


Neck:  Full Range of Motion, Non Tender


Respiratory:  Chest Non Tender, Lungs Clear


Cardiovascular:  Regular Rate, Rhythm, No Murmur


Peripheral Pulses:  2+ Radial Pulses (R), 2+ Radial Pulses (L)


Gastrointestinal:  normal bowel sounds, non tender, soft


Extremity:  Normal Capillary Refill, Non Tender, No Pedal Edema


Neurologic/Psychiatric:  Alert, Disoriented, Other (Advanced dementia)


Skin:  Normal Color, Warm/Dry, Other (2.5 cm circular lesion on forhead)


Lymphatic:  No Adenopathy





Results


Lab


Laboratory Tests


11/19/22 05:38: 


White Blood Count 11.9H, Red Blood Count 3.08L, Hemoglobin 9.4L, Hematocrit 28L,

Mean Corpuscular Volume 91, Mean Corpuscular Hemoglobin 31, Mean Corpuscular 

Hemoglobin Concent 34, Red Cell Distribution Width 13.3, Platelet Count 218, 

Mean Platelet Volume 9.4, Immature Granulocyte % (Auto) 1, Neutrophils (%) 

(Auto) 80H, Lymphocytes (%) (Auto) 10L, Monocytes (%) (Auto) 5, Eosinophils (%) 

(Auto) 4, Basophils (%) (Auto) 0, Neutrophils # (Auto) 9.5H, Lymphocytes # 

(Auto) 1.2, Monocytes # (Auto) 0.6, Eosinophils # (Auto) 0.5H, Basophils # 

(Auto) 0.1, Immature Granulocyte # (Auto) 0.1





Assessment/Plan


Perforated appendicitis


Advanced dementia 








Plan:


Continue Abx-


Monitor labs


Supportive care and conservative management due to risk/complications of surgery




CT to be repeated Monday





BERNARDA TYLER DO 11/19/22 1448:


Subjective


Subjective/Events-last exam


Patient  at bedside.  Patient seems to be comfortable.  reports 

she seems to be having less pain.  Tolerating diet. 


WBC decreasing.  Having hypertension. Not having any fever sweats chills 

difficulty breathing or chest pain.





Objective


Exam


General Appearance:  No Apparent Distress, WD/WN


HEENT:  PERRL/EOMI, Normal ENT Inspection


Neck:  Non Tender, Supple


Respiratory:  Chest Non Tender, No Accessory Muscle Use, No Respiratory Distress


Cardiovascular:  Regular Rate, Rhythm, No JVD


Gastrointestinal:  soft, tenderness (lower abdomen)


Extremity:  Normal Capillary Refill, Non Tender


Neurologic/Psychiatric:  Alert, Disoriented, Other (Advanced dementia)


Skin:  Normal Color, Warm/Dry


Lymphatic:  No Adenopathy





Assessment/Plan


Perforated appendicitis


Advanced dementia 


Htn





Continue Abx-


Pain control


Diet as tolerates


Supportive care and conservative management since perforated and planned delayed

appendectomy.


CT to be repeated Monday





Supervisory-Addendum Brief


Verification & Attestation


Participated in pt care:  history, MDM, physical


Personally performed:  exam, history, MDM, supervision of care


Care discussed with:  Medical Student


Procedures:  n/a


Results interpretation:  Verified all documentation


Verification and Attestation of Medical Student E/M Service





A medical student performed and documented this service in my presence. I 

reviewed and verified all information documented by the medical student and made

modifications to such information, when appropriate. I personally performed the 

physical exam and medical decision making. 





 Bernarda Tyler, Nov 19, 2022,14:47











RENITA KURTZ                Nov 19, 2022 07:48


BERNARDA TYLER DO              Nov 19, 2022 14:48

## 2022-11-19 NOTE — PHYSICAL THERAPY PROGRESS NOTE
Therapy Progress Note


Attempted PT evaluation again but patient is again sleeping deeply.   is 

in the room and states she has been getting up and walking with him several 

times a day and doesn't think PT is needed.  He says if the patient didn't have 

the IV pole she would be able to get up and ambulate on her own.  Will DC from 

PT services at this time.











DYLON MAZARIEGOS PT                Nov 19, 2022 08:11

## 2022-11-20 VITALS — DIASTOLIC BLOOD PRESSURE: 78 MMHG | SYSTOLIC BLOOD PRESSURE: 172 MMHG

## 2022-11-20 VITALS — DIASTOLIC BLOOD PRESSURE: 70 MMHG | SYSTOLIC BLOOD PRESSURE: 163 MMHG

## 2022-11-20 VITALS — DIASTOLIC BLOOD PRESSURE: 78 MMHG | SYSTOLIC BLOOD PRESSURE: 163 MMHG

## 2022-11-20 VITALS — DIASTOLIC BLOOD PRESSURE: 81 MMHG | SYSTOLIC BLOOD PRESSURE: 187 MMHG

## 2022-11-20 VITALS — DIASTOLIC BLOOD PRESSURE: 69 MMHG | SYSTOLIC BLOOD PRESSURE: 149 MMHG

## 2022-11-20 VITALS — SYSTOLIC BLOOD PRESSURE: 162 MMHG | DIASTOLIC BLOOD PRESSURE: 84 MMHG

## 2022-11-20 VITALS — DIASTOLIC BLOOD PRESSURE: 69 MMHG | SYSTOLIC BLOOD PRESSURE: 146 MMHG

## 2022-11-20 VITALS — DIASTOLIC BLOOD PRESSURE: 79 MMHG | SYSTOLIC BLOOD PRESSURE: 190 MMHG

## 2022-11-20 LAB
BASOPHILS # BLD AUTO: 0 10^3/UL (ref 0–0.1)
BASOPHILS NFR BLD AUTO: 0 % (ref 0–10)
BUN/CREAT SERPL: 10
CALCIUM SERPL-MCNC: 8.9 MG/DL (ref 8.5–10.1)
CHLORIDE SERPL-SCNC: 102 MMOL/L (ref 98–107)
CO2 SERPL-SCNC: 22 MMOL/L (ref 21–32)
CREAT SERPL-MCNC: 0.72 MG/DL (ref 0.6–1.3)
EOSINOPHIL # BLD AUTO: 0.5 10^3/UL (ref 0–0.3)
EOSINOPHIL NFR BLD AUTO: 5 % (ref 0–10)
GFR SERPLBLD BASED ON 1.73 SQ M-ARVRAT: 88 ML/MIN
GLUCOSE SERPL-MCNC: 111 MG/DL (ref 70–105)
HCT VFR BLD CALC: 27 % (ref 35–52)
HGB BLD-MCNC: 9.2 G/DL (ref 11.5–16)
LYMPHOCYTES # BLD AUTO: 1.4 10^3/UL (ref 1–4)
LYMPHOCYTES NFR BLD AUTO: 15 % (ref 12–44)
MANUAL DIFFERENTIAL PERFORMED BLD QL: NO
MCH RBC QN AUTO: 30 PG (ref 25–34)
MCHC RBC AUTO-ENTMCNC: 34 G/DL (ref 32–36)
MCV RBC AUTO: 89 FL (ref 80–99)
MONOCYTES # BLD AUTO: 0.7 10^3/UL (ref 0–1)
MONOCYTES NFR BLD AUTO: 7 % (ref 0–12)
NEUTROPHILS # BLD AUTO: 6.9 10^3/UL (ref 1.8–7.8)
NEUTROPHILS NFR BLD AUTO: 72 % (ref 42–75)
PLATELET # BLD: 261 10^3/UL (ref 130–400)
PMV BLD AUTO: 9.3 FL (ref 9–12.2)
POTASSIUM SERPL-SCNC: 2.9 MMOL/L (ref 3.6–5)
SODIUM SERPL-SCNC: 134 MMOL/L (ref 135–145)
WBC # BLD AUTO: 9.6 10^3/UL (ref 4.3–11)

## 2022-11-20 RX ADMIN — LISINOPRIL SCH MG: 20 TABLET ORAL at 20:32

## 2022-11-20 RX ADMIN — DONEPEZIL HYDROCHLORIDE SCH MG: 5 TABLET, FILM COATED ORAL at 07:31

## 2022-11-20 RX ADMIN — SODIUM CHLORIDE SCH MLS/HR: 900 INJECTION INTRAVENOUS at 02:28

## 2022-11-20 RX ADMIN — ENOXAPARIN SODIUM SCH MG: 100 INJECTION SUBCUTANEOUS at 15:16

## 2022-11-20 RX ADMIN — LISINOPRIL SCH MG: 20 TABLET ORAL at 07:32

## 2022-11-20 RX ADMIN — SODIUM CHLORIDE SCH MLS/HR: 900 INJECTION INTRAVENOUS at 17:39

## 2022-11-20 RX ADMIN — HYDRALAZINE HYDROCHLORIDE PRN MG: 20 INJECTION INTRAMUSCULAR; INTRAVENOUS at 07:31

## 2022-11-20 RX ADMIN — AMLODIPINE BESYLATE SCH MG: 5 TABLET ORAL at 07:31

## 2022-11-20 RX ADMIN — MORPHINE SULFATE PRN MG: 4 INJECTION, SOLUTION INTRAMUSCULAR; INTRAVENOUS at 12:07

## 2022-11-20 RX ADMIN — HYDRALAZINE HYDROCHLORIDE PRN MG: 20 INJECTION INTRAMUSCULAR; INTRAVENOUS at 12:07

## 2022-11-20 RX ADMIN — HYDROCODONE BITARTRATE AND ACETAMINOPHEN PRN EA: 5; 325 TABLET ORAL at 18:20

## 2022-11-20 RX ADMIN — SODIUM CHLORIDE SCH MLS/HR: 900 INJECTION INTRAVENOUS at 09:32

## 2022-11-20 RX ADMIN — SPIRONOLACTONE SCH MG: 25 TABLET ORAL at 07:31

## 2022-11-20 NOTE — PROGRESS NOTE - SURGERY
RENITA KURTZ 11/20/22 0742:


Subjective


Date Seen by a Provider:  Nov 20, 2022


Time Seen by a Provider:  07:37


Subjective/Events-last exam


Patient is laying in bed comfortably, very drowsy


Patients  is at bedside and helps with report due to patients dementia


Pain is currently well managed per , states that she only seems to be in 

discomfort when she gets up to urinate


Eating well


 is concerned she still has not had a bowel movement since Tuesday


Urinating without difficulty


Denies fever or chills


Labs reviewed


Review of Systems


General:  No Chills, No Night Sweats


HEENT:  No Sinus Congestion, No Post Nasal Drip


Pulmonary:  No Dyspnea, No Cough


Cardiovascular:  No: Palpitations, Edema


Gastrointestinal:  No: Nausea, Abdominal Pain


Genitourinary:  No Dysuria; Frequency


Musculoskeletal:  No: neck pain, shoulder pain


Neurological:  Confusion; No: Numbness





Objective


Exam





Vital Signs








  Date Time  Temp Pulse Resp B/P (MAP) Pulse Ox O2 Delivery O2 Flow Rate FiO2


 


11/20/22 07:28 36.5 79 18 190/79 (116) 98 Room Air 0.00 





       0.00 


 


11/20/22 03:37 36.9 94 16 162/84 (110) 97 Room Air  


 


11/20/22 00:11 37.3 76 16 163/70 (101) 97 Room Air  


 


11/19/22 20:00      Room Air  


 


11/19/22 19:39 36.9 88 18 184/79 (114) 98 Room Air  


 


11/19/22 15:48 37.0 102 16 142/72 (95) 96   


 


11/19/22 11:21 36.4 88 20 180/88 (118) 99 Room Air 0.00 





       0.00 


 


11/19/22 09:14    182/70 (107)    


 


11/19/22 08:00     97 Room Air 0.00 


 


11/19/22 07:39    200/88 (125)    














I & O 


 


 11/20/22





 07:00


 


Intake Total 785 ml


 


Balance 785 ml





Capillary Refill : Less Than 3 Seconds


General Appearance:  No Apparent Distress, WD/WN


HEENT:  PERRL/EOMI, Normal ENT Inspection


Neck:  Non Tender, Supple


Respiratory:  Chest Non Tender, Lungs Clear, No Accessory Muscle Use, No 

Respiratory Distress


Cardiovascular:  Regular Rate, Rhythm, No JVD


Peripheral Pulses:  2+ Radial Pulses (R), 2+ Radial Pulses (L)


Gastrointestinal:  non tender, soft


Extremity:  Normal Capillary Refill, Non Tender


Neurologic/Psychiatric:  Alert, Disoriented, Other (Advanced dementia)


Skin:  Normal Color, Warm/Dry, Other (Annular lesion of forhead 2.5 cm)


Lymphatic:  No Adenopathy





Results


Lab


Laboratory Tests


11/20/22 05:30: 


White Blood Count 9.6, Red Blood Count 3.05L, Hemoglobin 9.2L, Hematocrit 27L, 

Mean Corpuscular Volume 89, Mean Corpuscular Hemoglobin 30, Mean Corpuscular Hem

oglobin Concent 34, Red Cell Distribution Width 13.4, Platelet Count 261, Mean 

Platelet Volume 9.3, Immature Granulocyte % (Auto) 0, Neutrophils (%) (Auto) 72,

Lymphocytes (%) (Auto) 15, Monocytes (%) (Auto) 7, Eosinophils (%) (Auto) 5, 

Basophils (%) (Auto) 0, Neutrophils # (Auto) 6.9, Lymphocytes # (Auto) 1.4, 

Monocytes # (Auto) 0.7, Eosinophils # (Auto) 0.5H, Basophils # (Auto) 0.0, 

Immature Granulocyte # (Auto) 0.0, Sodium Level 134L, Potassium Level 2.9L, 

Chloride Level 102, Carbon Dioxide Level 22, Anion Gap 10, Blood Urea Nitrogen 

7, Creatinine 0.72, Estimat Glomerular Filtration Rate 88, BUN/Creatinine Ratio 

10, Glucose Level 111H, Calcium Level 8.9





Assessment/Plan


Perforated appendicitis


Advanced dementia 


Htn





Continue Abx-


Pain control


Diet as tolerated


Supportive care and conservative management since perforated and planned delayed

appendectomy.


CT to be repeated Monday





BERNARDA TYLER DO 11/20/22 1145:


Subjective


Subjective/Events-last exam


Paitent laying in bed.  Seems to be having less pain. Pain in the right lower 

quadrant.  Tolerating diet. 


wbc down.  no n/v fever sweats chills shortness of breath or chest pain.





Objective


Exam


General Appearance:  No Apparent Distress, WD/WN


HEENT:  PERRL/EOMI, Normal ENT Inspection


Neck:  Non Tender, Supple


Respiratory:  Chest Non Tender, No Accessory Muscle Use, No Respiratory Distress


Cardiovascular:  Regular Rate, Rhythm, No JVD


Gastrointestinal:  non tender, soft


Extremity:  Normal Capillary Refill, Non Tender


Neurologic/Psychiatric:  Alert, Disoriented, Other (Advanced dementia)


Skin:  Normal Color, Warm/Dry


Lymphatic:  No Adenopathy





Assessment/Plan


Perforated appendicitis


Advanced dementia 


Htn





Continue Abx-


Pain control


Diet as tolerated


Supportive care and conservative management since perforated and planned delayed

appendectomy.


CT to be repeated Monday








Supervisory-Addendum Brief


Verification & Attestation


Participated in pt care:  history, MDM, physical


Personally performed:  exam, history, MDM, supervision of care


Care discussed with:  Medical Student


Procedures:  n/a


Results interpretation:  Verified all documentation


Verification and Attestation of Medical Student E/M Service





A medical student performed and documented this service in my presence. I re

viewed and verified all information documented by the medical student and made 

modifications to such information, when appropriate. I personally performed the 

physical exam and medical decision making. 





 Bernarda Tyler, Nov 20, 2022,11:45











RENITA KURTZ                Nov 20, 2022 07:42


BERNARDA TYLER DO              Nov 20, 2022 11:45

## 2022-11-20 NOTE — PROGRESS NOTE - HOSPITALIST
Subjective


HPI/CC On Admission


Date Seen by Provider:  Nov 20, 2022


Pt is a 72yoCF with a PMH of advanced dementia, HTN, and HLD who presented to 

the ER due to abd pain. She is sleeping soundly after having received morphine 

and does not provide any history due to that and her advanced dementia.  

provides all of the history. He reports that she developed abd pain around 1800 

last night and was groaning. She also had poor oral intake yesterday. She had a 

similar episode back in August but was told it was her colon at that time and 

prescribed Flagyl and Cipro as an outpatient. Imaging today revealed acute 

appendicitis. She is admitted to surgery and I am consulted for medical 

management.


Subjective/Events-last exam


Pt more alert today.  at bedside and states she ate well and seems to be 

feeling better. Worried abotu her having a BM but RN reports she had a large BM 

yesterday while he was gone.





Objective


Exam


Vital Signs





Vital Signs








  Date Time  Temp Pulse Resp B/P (MAP) Pulse Ox O2 Delivery O2 Flow Rate FiO2


 


11/20/22 08:00     98 Room Air 0.00 


 


11/20/22 07:28 36.5 79 18 190/79 (116)    





Capillary Refill : Less Than 3 Seconds


General Appearance:  No Apparent Distress, Chronically ill


Respiratory:  Lungs Clear, No Respiratory Distress


Cardiovascular:  Regular Rate, Rhythm, No Murmur


Neurologic/Psychiatric:  Alert, Disoriented





Results/Procedures


Lab


Laboratory Tests


11/20/22 05:30








Patient resulted labs reviewed.


Imaging:  Reviewed Imaging Report





Assessment/Plan


Assessment and Plan


Assess & Plan/Chief Complaint


Acute appendicitis with questionable perforation


Advanced Dementia


   Management per surgery- plan to defer surgery for a few weeks and treat with 

IV abx


   Pain improving


   Tolerating diet


   BM yesterday


   Replace K





HTN


HLD


   BP still high but trending down from yesterday


   trend


   Hydralazine prn





DVT ppx: Lovenox











ROVERTO FIORE MD              Nov 20, 2022 10:06

## 2022-11-21 VITALS — SYSTOLIC BLOOD PRESSURE: 164 MMHG | DIASTOLIC BLOOD PRESSURE: 72 MMHG

## 2022-11-21 VITALS — SYSTOLIC BLOOD PRESSURE: 184 MMHG | DIASTOLIC BLOOD PRESSURE: 84 MMHG

## 2022-11-21 VITALS — DIASTOLIC BLOOD PRESSURE: 78 MMHG | SYSTOLIC BLOOD PRESSURE: 170 MMHG

## 2022-11-21 VITALS — SYSTOLIC BLOOD PRESSURE: 154 MMHG | DIASTOLIC BLOOD PRESSURE: 84 MMHG

## 2022-11-21 VITALS — SYSTOLIC BLOOD PRESSURE: 135 MMHG | DIASTOLIC BLOOD PRESSURE: 85 MMHG

## 2022-11-21 VITALS — SYSTOLIC BLOOD PRESSURE: 130 MMHG | DIASTOLIC BLOOD PRESSURE: 83 MMHG

## 2022-11-21 VITALS — DIASTOLIC BLOOD PRESSURE: 75 MMHG | SYSTOLIC BLOOD PRESSURE: 146 MMHG

## 2022-11-21 VITALS — DIASTOLIC BLOOD PRESSURE: 68 MMHG | SYSTOLIC BLOOD PRESSURE: 141 MMHG

## 2022-11-21 VITALS — SYSTOLIC BLOOD PRESSURE: 148 MMHG | DIASTOLIC BLOOD PRESSURE: 61 MMHG

## 2022-11-21 LAB
BASOPHILS # BLD AUTO: 0.1 10^3/UL (ref 0–0.1)
BASOPHILS NFR BLD AUTO: 1 % (ref 0–10)
BUN/CREAT SERPL: 9
CALCIUM SERPL-MCNC: 9.7 MG/DL (ref 8.5–10.1)
CHLORIDE SERPL-SCNC: 102 MMOL/L (ref 98–107)
CO2 SERPL-SCNC: 21 MMOL/L (ref 21–32)
CREAT SERPL-MCNC: 0.81 MG/DL (ref 0.6–1.3)
EOSINOPHIL # BLD AUTO: 0.6 10^3/UL (ref 0–0.3)
EOSINOPHIL NFR BLD AUTO: 5 % (ref 0–10)
GFR SERPLBLD BASED ON 1.73 SQ M-ARVRAT: 76 ML/MIN
GLUCOSE SERPL-MCNC: 187 MG/DL (ref 70–105)
HCT VFR BLD CALC: 31 % (ref 35–52)
HGB BLD-MCNC: 10.7 G/DL (ref 11.5–16)
INR PPP: 1.1 (ref 0.8–1.4)
LYMPHOCYTES # BLD AUTO: 1.9 10^3/UL (ref 1–4)
LYMPHOCYTES NFR BLD AUTO: 17 % (ref 12–44)
MAGNESIUM SERPL-MCNC: 1.8 MG/DL (ref 1.6–2.4)
MANUAL DIFFERENTIAL PERFORMED BLD QL: NO
MCH RBC QN AUTO: 31 PG (ref 25–34)
MCHC RBC AUTO-ENTMCNC: 35 G/DL (ref 32–36)
MCV RBC AUTO: 90 FL (ref 80–99)
MONOCYTES # BLD AUTO: 0.7 10^3/UL (ref 0–1)
MONOCYTES NFR BLD AUTO: 6 % (ref 0–12)
NEUTROPHILS # BLD AUTO: 8.1 10^3/UL (ref 1.8–7.8)
NEUTROPHILS NFR BLD AUTO: 71 % (ref 42–75)
PLATELET # BLD: 407 10^3/UL (ref 130–400)
PMV BLD AUTO: 9.6 FL (ref 9–12.2)
POTASSIUM SERPL-SCNC: 3.3 MMOL/L (ref 3.6–5)
PROTHROMBIN TIME: 14.1 SEC (ref 12.2–14.7)
SODIUM SERPL-SCNC: 137 MMOL/L (ref 135–145)
WBC # BLD AUTO: 11.4 10^3/UL (ref 4.3–11)

## 2022-11-21 RX ADMIN — SPIRONOLACTONE SCH MG: 25 TABLET ORAL at 08:05

## 2022-11-21 RX ADMIN — MORPHINE SULFATE PRN MG: 4 INJECTION, SOLUTION INTRAMUSCULAR; INTRAVENOUS at 22:44

## 2022-11-21 RX ADMIN — SODIUM CHLORIDE SCH MLS/HR: 900 INJECTION INTRAVENOUS at 02:28

## 2022-11-21 RX ADMIN — SODIUM CHLORIDE SCH MLS/HR: 900 INJECTION INTRAVENOUS at 20:35

## 2022-11-21 RX ADMIN — SODIUM CHLORIDE SCH MLS/HR: 900 INJECTION INTRAVENOUS at 12:13

## 2022-11-21 RX ADMIN — ENOXAPARIN SODIUM SCH MG: 100 INJECTION SUBCUTANEOUS at 15:58

## 2022-11-21 RX ADMIN — DONEPEZIL HYDROCHLORIDE SCH MG: 5 TABLET, FILM COATED ORAL at 08:05

## 2022-11-21 RX ADMIN — LISINOPRIL SCH MG: 20 TABLET ORAL at 08:05

## 2022-11-21 RX ADMIN — LISINOPRIL SCH MG: 20 TABLET ORAL at 20:35

## 2022-11-21 RX ADMIN — AMLODIPINE BESYLATE SCH MG: 5 TABLET ORAL at 08:05

## 2022-11-21 NOTE — DIAGNOSTIC IMAGING REPORT
INDICATION: 

RLQ ABD DRAIN PLACEMENT.



TECHNIQUE: 

All CT scans use one or more of the following dose optimizing

techniques: automated exposure control, MA and/or KvP adjustment

based on patient size and exam type or iterative reconstruction. 



DETAILS OF THE PROCEDURE:

The patient was brought to the CT suite and placed on the table

in the supine position. Axial imaging through the lower abdomen

and pelvis was performed to evaluate for an appropriate entry

site. The right lower quadrant was prepped and draped in the

usual sterile fashion. The procedure was performed utilizing

conscious sedation with Radiology nursing and constant patient

monitoring. The patient was given a total of 50 mg of fentanyl

intravenously and 1.5 mg of Versed intravenously. The total

procedure time was approximately 7 minutes.



A small amount of 1% lidocaine was utilized for local anesthesia.

The gas and fluid collection in the right lower quadrant was

percutaneously accessed utilizing a Yueh needle. This was

exchanged over an 035 guidewire. 6-[place] and 8-[place]i dilators

were utilized to dilate the tract. Next, an 8.5-[place]i

all-purpose pigtail drain was advanced over the 035 wire and

placed into the fluid collection in the right lower quadrant. The

loop was formed. Approximately 20 cc of purulent fluid was

removed. The pigtail drain was placed through an accordion drain.

The patient tolerated the procedure well and left the Department

in stable condition.



IMPRESSION: 

Successful CT-guided percutaneous all-purpose drain placement

into the right lower quadrant fluid collection utilizing

conscious sedation. 20 cc of purulent fluid was removed. The

catheter was placed to an accordion drain. The patient tolerated

the procedure well.



Dictated by: 



  Dictated on workstation # [id number]

## 2022-11-21 NOTE — PROGRESS NOTE
Subjective


Date Seen by a Provider:  Nov 21, 2022


Time Seen by a Provider:  18:00


Subjective/Events-last exam


doing ok. pain controlled. tolerating diet. repeat CT scan showed abscess 

formation. WBC around normal range and afebrile.





Objective


Exam





Vital Signs








  Date Time  Temp Pulse Resp B/P (MAP) Pulse Ox O2 Delivery O2 Flow Rate FiO2


 


11/21/22 16:20 37.0 92 19 184/84 (117) 99 Room Air  


 


11/21/22 14:39 36.5 85 18 130/83 (99) 98 Room Air  


 


11/21/22 14:00  90 12 141/68 99 Nasal Cannula 2.00 


 


11/21/22 13:55  86 14 154/84 99 Nasal Cannula 2.00 


 


11/21/22 13:50  81 14 148/61 99 Nasal Cannula 2.00 


 


11/21/22 11:40 37.4 88 20 170/78 (108) 100 Room Air  


 


11/21/22 08:45      Room Air  


 


11/21/22 08:15 36.8 98 20 135/85 (102) 92 Room Air  


 


11/21/22 03:27 36.9 84 16 164/72 (102) 97 Room Air  


 


11/20/22 23:51 36.8 86 16 146/69 (94) 98 Room Air  


 


11/20/22 20:43      Room Air  


 


11/20/22 19:58 36.7 102 18 163/78 (106) 98 Room Air  














I & O 


 


 11/21/22





 06:59


 


Intake Total 2790 ml


 


Balance 2790 ml





Capillary Refill : Less Than 3 Seconds


General Appearance:  No Apparent Distress


HEENT:  PERRL/EOMI


Neck:  Full Range of Motion


Respiratory:  Chest Non Tender, Lungs Clear


Cardiovascular:  Regular Rate, Rhythm


Gastrointestinal:  normal bowel sounds, soft, tenderness


Extremity:  Normal Capillary Refill


Neurologic/Psychiatric:  Alert


Skin:  Normal Color


Lymphatic:  No Adenopathy





Results


Lab


Laboratory Tests


11/21/22 09:13: 


White Blood Count 11.4H, Red Blood Count 3.45L, Hemoglobin 10.7L, Hematocrit 31L

, Mean Corpuscular Volume 90, Mean Corpuscular Hemoglobin 31, Mean Corpuscular 

Hemoglobin Concent 35, Red Cell Distribution Width 13.8, Platelet Count 407H, 

Mean Platelet Volume 9.6, Immature Granulocyte % (Auto) 1, Neutrophils (%) 

(Auto) 71, Lymphocytes (%) (Auto) 17, Monocytes (%) (Auto) 6, Eosinophils (%) 

(Auto) 5, Basophils (%) (Auto) 1, Neutrophils # (Auto) 8.1H, Lymphocytes # 

(Auto) 1.9, Monocytes # (Auto) 0.7, Eosinophils # (Auto) 0.6H, Basophils # 

(Auto) 0.1, Immature Granulocyte # (Auto) 0.1, Prothrombin Time 14.1, INR 

Comment 1.1, Sodium Level 137, Potassium Level 3.3L, Chloride Level 102, Carbon 

Dioxide Level 21, Anion Gap 14, Blood Urea Nitrogen 7, Creatinine 0.81, Estimat 

Glomerular Filtration Rate 76, BUN/Creatinine Ratio 9, Glucose Level 187H, 

Calcium Level 9.7, Magnesium Level 1.8





Microbiology


11/21/22 Gram Stain, Resulted


           Pending


11/21/22 Anaerobic Culture, Resulted


           Pending


11/21/22 Wound Culture - Preliminary, Resulted





Assessment/Plan


Assessment/Plan


Assess & Plan/Chief Complaint


perforated appendicitis-chronic and organizing phase.


literature now recommends bowel rest and IV abx due to increased 

risk/complications with surgery.


will continue with conservative management with frequent clinical exams and 

labs.


will cont current management over weekend and get CT abd/pelvis on monday and if

no abscess and clinically doing well overall will d/c home. 


abscess identified and s/p CT guided drainage. if clinical stable will d/c home.











SHANI GUO MD                Nov 21, 2022 18:24

## 2022-11-21 NOTE — DIAGNOSTIC IMAGING REPORT
PROCEDURE: CT abdomen and pelvis with and without contrast.



TECHNIQUE: Precontrast acquisitions were acquired through the

abdomen and pelvis. Multiple contiguous axial images were

obtained through the abdomen and pelvis after the administration

of intravenous contrast. Auto Exposure Controls were utilized

during the CT exam to meet ALARA standards for radiation dose

reduction. 



INDICATION:  Perforated appendicitis, followup.



Correlation is made with prior CT from 11/16/2022.



Lung bases are clear. The liver and gallbladder are unremarkable.

Pancreas and spleen are unremarkable. No adrenal mass is

detected. The left upper pole renal cyst is again seen. Aorta is

calcified but nonaneurysmal. There is a large amount of stool

throughout the colon. Appendiceal dilatation and wall thickening

is again noted consistent with appendicitis. There is a small

amount of free fluid in the right midabdomen. There appears to be

a more well-formed fluid collection in the right lower quadrant

which does contain some gas. There are 2 locules present. The

more medial locule with fluid and gas measures approximately 4.9

cm cephalocaudal by 3.4 cm transverse by 3.7 cm AP. Just lateral

and slightly cephalad a locule measures 2.8 cm cephalocaudal by

2.4 cm transverse by 2.9 cm AP. These may communicate. No other

fluid collections are seen. The bladder and uterus are

unremarkable.



IMPRESSION: Features remain consistent with acute appendicitis

with likely a perforation. There is a fluid collection with

peripheral enhancement in the right lower quadrant concerning for

abscess. No definite bowel obstruction is identified. There is

moderate stool throughout the colon and rectum.











Dictated by: 



  Dictated on workstation # [id number]

## 2022-11-21 NOTE — PRE-OP NOTE & CONSCIOUS SEDAT
Pre-Operative Progress Note


Date of Available H&P:  Nov 21, 2022


Date H&P Reviewed:  Nov 21, 2022


Time H&P Reviewed:  13:00


Pre-Op Diagnosis:  abdominal abscess





Conscious Sedation Pre-Proced


Time


13:00





ASA Score


2


For ASA 3 and 4: Consider anesthesia and medical clearance. Also, for patients

with a history of failed moderate sedation consider anesthesia.

















Airway 


 


Lungs 


 


Heart 


 


 ASA score


 


 ASA 1: a normal healthy patient


 


 ASA 2:  a patient with a mild systemic disease (mid diabetes, controlled 

hypertension, obesity 


 


 ASA 3:  a patient with a severe systemic disease that limits activity  (angina,

COPD, prior Myocardial infarction)


 


 ASA 4:  a patient with an incapacitating disease that is a constant threat to 

life (CHF, renal failure)


 


 ASA 5:  a moribund patient not expected to survive 24 hrs.  (ruptured aneurysm)


 


 ASA 6:  a declared brain-dead patient whose organs are being harvested.


 


 For emergent operations, add the letter E after the classification











Mallampati Classification


Grade 2





Sedation Plan


Analgesia, Amnesia, Plan communicated to team members, Discussed options with 

patient/fam, Discussed risks with patient/fam


The patient is an appropriate candidate to undergo the planned procedure, 

sedation, and anesthesia.





The patient immediately re-assessed prior to indication.











FLORIAN PAYNE MD             Nov 21, 2022 15:07

## 2022-11-21 NOTE — PROGRESS NOTE - HOSPITALIST
DONORIONELBERT A MED STUDENT 11/21/22 1138:


Subjective


HPI/CC On Admission


Date Seen by Provider:  Nov 21, 2022


Time Seen by Provider:  08:00


Pt is a 72yoCF with a PMH of advanced dementia, HTN, and HLD who presented to 

the ER due to abd pain. She is sleeping soundly after having received morphine 

and does not provide any history due to that and her advanced dementia.  

provides all of the history. He reports that she developed abd pain around 1800 

last night and was groaning. She also had poor oral intake yesterday. She had a 

similar episode back in August but was told it was her colon at that time and 

prescribed Flagyl and Cipro as an outpatient. Imaging today revealed acute 

appendicitis. She is admitted to surgery and I am consulted for medical 

management.


Subjective/Events-last exam


Niecy is a 73 yo female being seen in f/u for acute appendicitis with perforation 

and abscess. Pt has dementia, so  is primary historian. Pt is up in the 

chair eating breakfast, which she is tolerating well according to him. He states

she has not complained of any pain, N/V. She had a BM yesterday. Complete ROS 

unable to be obtained d/t dementia.





Objective


Exam


Vital Signs





Vital Signs








  Date Time  Temp Pulse Resp B/P (MAP) Pulse Ox O2 Delivery O2 Flow Rate FiO2


 


11/21/22 08:45      Room Air  


 


11/21/22 08:15 36.8 98 20 135/85 (102) 92   


 


11/20/22 11:48       0.00 





       0.00 





Capillary Refill : Less Than 3 Seconds


General Appearance:  No Apparent Distress, Chronically ill


HEENT:  PERRL/EOMI, Moist Mucous Membranes


Neck:  Full Range of Motion, Normal Inspection


Respiratory:  Chest Non Tender, Lungs Clear


Cardiovascular:  Regular Rate, Rhythm, No Murmur


Gastrointestinal:  Normal Bowel Sounds, Other (RLQ and LLQ rigidity upon 

palpation)


Extremity:  Normal Capillary Refill, Non Tender, Pedal Edema (1+ pitting edema 

bilaterally)


Neurologic/Psychiatric:  Alert, Disoriented


Skin:  Normal Color, Warm/Dry





Results/Procedures


Lab


Laboratory Tests


11/21/22 09:13








Patient resulted labs reviewed.


Imaging:  Reviewed Imaging Report





Assessment/Plan


Assessment and Plan


Assess & Plan/Chief Complaint


Appendicitis with perforation and abscess formation


HTN


Hypokalemia


HLD





Appendicitis with perforation and abscess formation


   -Repeat CT on 11/21 revealed two loculations with abscess formation


   -General surgery consulted, appreciate their recs


   -Continue Zosyn


   -Continue Morphine and Lortab as needed


Severe Dementia


   -Donepezil


   -Fall risk precautions


HTN


   -Stop amlodipine and switch back to HCTZ 12.5mg, which is patients normal 

home regimen


   -Monitor for electrolyte abnormalities


   -Continue lisinopril 20mg BID and Spironolactone 25mg once daily


Hypokalemia


   -Improving with KCl administration


HLD





Disposition: Will consult with general surgery about drain placement vs. 

appendectomy as inpatient.


Diet: Regular


Code Status: Full Code


DVT ppx: Lovenox


Fall Precautions





DIXON BAKER MD 11/21/22 1642:


Subjective


HPI/CC On Admission


Time Seen by Provider:  10:00





Assessment/Plan


Assessment and Plan


Assess & Plan/Chief Complaint


Patient with advanced dementia admitted due to appendicitis with perforation and

abscess. Surgery primary, treating conservatively. Repeat CT today. Blood 

pressure improving.





Diagnosis/Problems


Diagnosis/Problems





(1) Acute appendicitis


Status:  Acute


Qualifiers:  


   Qualified Codes:  K35.30 - Acute appendicitis with localized peritonitis, 

without perforation or gangrene


(2) Severe dementia


Status:  Acute


Qualifiers:  


   Qualified Codes:  F03.C11 - Unspecified dementia, severe, with agitation


(3) HTN (hypertension)


Status:  Acute


(4) Electrolyte abnormality


Status:  Acute





Supervisory-Addendum Brief


Verification & Attestation


Participated in pt care:  history, MDM, physical


Personally performed:  exam, history, MDM, supervision of care


Care discussed with:  Medical Student


Procedures:  n/a


Results interpretation:  Verified all documentation


A medical student performed and documented this service in my presence. I revie

wed and verified all information documented by the medical student and made 

modifications to such information, when appropriate. I personally performed the 

physical exam and medical decision making.











ELBERT RUSH A MED STUDENT    Nov 21, 2022 11:38


DIXON BAKER MD              Nov 21, 2022 16:42

## 2022-11-21 NOTE — DISCHARGE INST-SURGICAL
D/C Lap Instructions-SARI


New, Converted, or Re-Newed RX:  RX on Chart


Follow Up Appt in 1 week





Activity as tolerated


No driving for 24 hours


No driving while on pain medications





drain care and log outputs.





Regular Diet





Symptoms to Report: Fever over 101 degree F, Nausea/Vomiting 


Infection Signs and Symptoms to report:  Increased redness, Foul odor of wound, 

Increased drainage





Bathing instructions: May shower


Operative Area Clean/Dry;  Keep incision clean/dry





If any problems/questions: Contact your physician or go to Emergency Room











SHANI GUO MD                Nov 21, 2022 18:17

## 2022-11-22 VITALS — DIASTOLIC BLOOD PRESSURE: 72 MMHG | SYSTOLIC BLOOD PRESSURE: 185 MMHG

## 2022-11-22 VITALS — DIASTOLIC BLOOD PRESSURE: 90 MMHG | SYSTOLIC BLOOD PRESSURE: 166 MMHG

## 2022-11-22 VITALS — SYSTOLIC BLOOD PRESSURE: 141 MMHG | DIASTOLIC BLOOD PRESSURE: 70 MMHG

## 2022-11-22 VITALS — DIASTOLIC BLOOD PRESSURE: 67 MMHG | SYSTOLIC BLOOD PRESSURE: 133 MMHG

## 2022-11-22 VITALS — SYSTOLIC BLOOD PRESSURE: 133 MMHG | DIASTOLIC BLOOD PRESSURE: 67 MMHG

## 2022-11-22 VITALS — DIASTOLIC BLOOD PRESSURE: 72 MMHG | SYSTOLIC BLOOD PRESSURE: 148 MMHG

## 2022-11-22 LAB
BASOPHILS # BLD AUTO: 0.1 10^3/UL (ref 0–0.1)
BASOPHILS NFR BLD AUTO: 1 % (ref 0–10)
BUN/CREAT SERPL: 12
CALCIUM SERPL-MCNC: 9.4 MG/DL (ref 8.5–10.1)
CHLORIDE SERPL-SCNC: 102 MMOL/L (ref 98–107)
CO2 SERPL-SCNC: 22 MMOL/L (ref 21–32)
CREAT SERPL-MCNC: 0.81 MG/DL (ref 0.6–1.3)
EOSINOPHIL # BLD AUTO: 0.7 10^3/UL (ref 0–0.3)
EOSINOPHIL NFR BLD AUTO: 6 % (ref 0–10)
GFR SERPLBLD BASED ON 1.73 SQ M-ARVRAT: 76 ML/MIN
GLUCOSE SERPL-MCNC: 163 MG/DL (ref 70–105)
HCT VFR BLD CALC: 30 % (ref 35–52)
HGB BLD-MCNC: 10.2 G/DL (ref 11.5–16)
LYMPHOCYTES # BLD AUTO: 1.7 10^3/UL (ref 1–4)
LYMPHOCYTES NFR BLD AUTO: 16 % (ref 12–44)
MANUAL DIFFERENTIAL PERFORMED BLD QL: NO
MCH RBC QN AUTO: 31 PG (ref 25–34)
MCHC RBC AUTO-ENTMCNC: 34 G/DL (ref 32–36)
MCV RBC AUTO: 90 FL (ref 80–99)
MONOCYTES # BLD AUTO: 0.9 10^3/UL (ref 0–1)
MONOCYTES NFR BLD AUTO: 9 % (ref 0–12)
NEUTROPHILS # BLD AUTO: 7.1 10^3/UL (ref 1.8–7.8)
NEUTROPHILS NFR BLD AUTO: 67 % (ref 42–75)
PLATELET # BLD: 398 10^3/UL (ref 130–400)
PMV BLD AUTO: 8.8 FL (ref 9–12.2)
POTASSIUM SERPL-SCNC: 3.2 MMOL/L (ref 3.6–5)
SODIUM SERPL-SCNC: 137 MMOL/L (ref 135–145)
WBC # BLD AUTO: 10.6 10^3/UL (ref 4.3–11)

## 2022-11-22 RX ADMIN — LISINOPRIL SCH MG: 20 TABLET ORAL at 07:36

## 2022-11-22 RX ADMIN — HYDRALAZINE HYDROCHLORIDE PRN MG: 20 INJECTION INTRAMUSCULAR; INTRAVENOUS at 00:42

## 2022-11-22 RX ADMIN — SODIUM CHLORIDE SCH MLS/HR: 900 INJECTION INTRAVENOUS at 05:28

## 2022-11-22 RX ADMIN — SPIRONOLACTONE SCH MG: 25 TABLET ORAL at 07:36

## 2022-11-22 RX ADMIN — AMLODIPINE BESYLATE SCH MG: 5 TABLET ORAL at 07:36

## 2022-11-22 RX ADMIN — SODIUM CHLORIDE SCH MLS/HR: 900 INJECTION INTRAVENOUS at 12:00

## 2022-11-22 RX ADMIN — DONEPEZIL HYDROCHLORIDE SCH MG: 5 TABLET, FILM COATED ORAL at 07:36

## 2022-11-22 NOTE — PROGRESS NOTE - HOSPITALIST
Subjective


HPI/CC On Admission


Date Seen by Provider:  Nov 22, 2022


Time Seen by Provider:  10:20


Pt is a 72yoCF with a PMH of advanced dementia, HTN, and HLD who presented to 

the ER due to abd pain. She is sleeping soundly after having received morphine 

and does not provide any history due to that and her advanced dementia.  

provides all of the history. He reports that she developed abd pain around 1800 

last night and was groaning. She also had poor oral intake yesterday. She had a 

similar episode back in August but was told it was her colon at that time and 

prescribed Flagyl and Cipro as an outpatient. Imaging today revealed acute 

appendicitis. She is admitted to surgery and I am consulted for medical 

management.


Subjective/Events-last exam


She is sitting in her chair. She seems to be feeling better today.





Objective


Exam


Vital Signs





Vital Signs








  Date Time  Temp Pulse Resp B/P (MAP) Pulse Ox O2 Delivery O2 Flow Rate FiO2


 


11/22/22 13:42 37.3 88 20 133/67 97 Room Air 2.00 





Capillary Refill : Less Than 3 Seconds


General Appearance:  No Apparent Distress, WD/WN


Respiratory:  Lungs Clear, No Respiratory Distress


Cardiovascular:  Regular Rate, Rhythm, No Murmur


Gastrointestinal:  Normal Bowel Sounds, Soft


Extremity:  Normal Inspection, Pedal Edema


Neurologic/Psychiatric:  Alert, Disoriented





Results/Procedures


Lab


Laboratory Tests


11/22/22 09:22








Patient resulted labs reviewed.


Imaging:  Reviewed Imaging Report





Assessment/Plan


Assessment and Plan


Assess & Plan/Chief Complaint


Appendicitis


   Surgery primary


   s/p drain placement


   Planning for outpatient follow up





HTN


   Continue home meds





Advanced dementia


   Very advanced


   Poor prognosis





Diagnosis/Problems


Diagnosis/Problems





(1) Acute appendicitis


Status:  Acute


Qualifiers:  


   Acute appendicitis type:  with localized peritonitis  Appendicitis gangrene 

presence:  without gangrene  Appendicitis perforation presence:  without 

perforation  Appendicitis abscess presence:  without abscess  Qualified Codes:  

K35.30 - Acute appendicitis with localized peritonitis, without perforation or 

gangrene


(2) Severe dementia


Status:  Acute


Qualifiers:  


   Dementia type:  unspecified type  Dementia behavioral or psychological 

symptom:  with agitation  Qualified Codes:  F03.C11 - Unspecified dementia, 

severe, with agitation


(3) HTN (hypertension)


Status:  Acute


(4) Electrolyte abnormality


Status:  Acute











DIXON BAKER MD              Nov 22, 2022 16:10

## 2022-12-10 ENCOUNTER — HOSPITAL ENCOUNTER (EMERGENCY)
Dept: HOSPITAL 75 - ER | Age: 72
Discharge: HOME | End: 2022-12-10
Payer: MEDICARE

## 2022-12-10 VITALS — BODY MASS INDEX: 24.88 KG/M2 | HEIGHT: 63.98 IN | WEIGHT: 145.73 LBS

## 2022-12-10 VITALS — SYSTOLIC BLOOD PRESSURE: 183 MMHG | DIASTOLIC BLOOD PRESSURE: 71 MMHG

## 2022-12-10 DIAGNOSIS — K05.10: Primary | ICD-10-CM

## 2022-12-10 DIAGNOSIS — R19.5: ICD-10-CM

## 2022-12-10 DIAGNOSIS — Z87.19: ICD-10-CM

## 2022-12-10 DIAGNOSIS — Z87.891: ICD-10-CM

## 2022-12-10 PROCEDURE — 99283 EMERGENCY DEPT VISIT LOW MDM: CPT
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
## 2022-12-29 ENCOUNTER — HOSPITAL ENCOUNTER (OUTPATIENT)
Dept: HOSPITAL 75 - PREOP | Age: 72
Discharge: HOME | End: 2022-12-29
Attending: SURGERY
Payer: MEDICARE

## 2022-12-29 VITALS — HEIGHT: 64.02 IN | BODY MASS INDEX: 24.77 KG/M2 | WEIGHT: 145.06 LBS

## 2022-12-29 DIAGNOSIS — Z01.818: Primary | ICD-10-CM

## 2023-01-05 ENCOUNTER — HOSPITAL ENCOUNTER (OUTPATIENT)
Dept: HOSPITAL 75 - SDC | Age: 73
Setting detail: OBSERVATION
LOS: 1 days | Discharge: HOME | End: 2023-01-06
Attending: SURGERY | Admitting: SURGERY
Payer: MEDICARE

## 2023-01-05 VITALS — DIASTOLIC BLOOD PRESSURE: 86 MMHG | SYSTOLIC BLOOD PRESSURE: 148 MMHG

## 2023-01-05 VITALS — SYSTOLIC BLOOD PRESSURE: 164 MMHG | DIASTOLIC BLOOD PRESSURE: 80 MMHG

## 2023-01-05 VITALS — HEIGHT: 64.02 IN | BODY MASS INDEX: 24.77 KG/M2 | WEIGHT: 145.06 LBS

## 2023-01-05 VITALS — DIASTOLIC BLOOD PRESSURE: 69 MMHG | SYSTOLIC BLOOD PRESSURE: 151 MMHG

## 2023-01-05 VITALS — DIASTOLIC BLOOD PRESSURE: 65 MMHG | SYSTOLIC BLOOD PRESSURE: 140 MMHG

## 2023-01-05 VITALS — SYSTOLIC BLOOD PRESSURE: 173 MMHG | DIASTOLIC BLOOD PRESSURE: 83 MMHG

## 2023-01-05 VITALS — DIASTOLIC BLOOD PRESSURE: 73 MMHG | SYSTOLIC BLOOD PRESSURE: 146 MMHG

## 2023-01-05 VITALS — SYSTOLIC BLOOD PRESSURE: 168 MMHG | DIASTOLIC BLOOD PRESSURE: 71 MMHG

## 2023-01-05 VITALS — DIASTOLIC BLOOD PRESSURE: 80 MMHG | SYSTOLIC BLOOD PRESSURE: 177 MMHG

## 2023-01-05 VITALS — SYSTOLIC BLOOD PRESSURE: 155 MMHG | DIASTOLIC BLOOD PRESSURE: 81 MMHG

## 2023-01-05 VITALS — SYSTOLIC BLOOD PRESSURE: 155 MMHG | DIASTOLIC BLOOD PRESSURE: 64 MMHG

## 2023-01-05 VITALS — SYSTOLIC BLOOD PRESSURE: 169 MMHG | DIASTOLIC BLOOD PRESSURE: 94 MMHG

## 2023-01-05 VITALS — DIASTOLIC BLOOD PRESSURE: 75 MMHG | SYSTOLIC BLOOD PRESSURE: 164 MMHG

## 2023-01-05 VITALS — DIASTOLIC BLOOD PRESSURE: 64 MMHG | SYSTOLIC BLOOD PRESSURE: 152 MMHG

## 2023-01-05 DIAGNOSIS — I10: ICD-10-CM

## 2023-01-05 DIAGNOSIS — I48.0: ICD-10-CM

## 2023-01-05 DIAGNOSIS — Z87.891: ICD-10-CM

## 2023-01-05 DIAGNOSIS — S37.20XA: ICD-10-CM

## 2023-01-05 DIAGNOSIS — K35.32: Primary | ICD-10-CM

## 2023-01-05 DIAGNOSIS — F03.90: ICD-10-CM

## 2023-01-05 DIAGNOSIS — K82.8: ICD-10-CM

## 2023-01-05 PROCEDURE — 85025 COMPLETE CBC W/AUTO DIFF WBC: CPT

## 2023-01-05 PROCEDURE — 36415 COLL VENOUS BLD VENIPUNCTURE: CPT

## 2023-01-05 PROCEDURE — 80048 BASIC METABOLIC PNL TOTAL CA: CPT

## 2023-01-05 PROCEDURE — 51860 REPAIR OF BLADDER WOUND: CPT

## 2023-01-05 PROCEDURE — 83735 ASSAY OF MAGNESIUM: CPT

## 2023-01-05 PROCEDURE — 80061 LIPID PANEL: CPT

## 2023-01-05 PROCEDURE — 87081 CULTURE SCREEN ONLY: CPT

## 2023-01-05 PROCEDURE — 93306 TTE W/DOPPLER COMPLETE: CPT

## 2023-01-05 PROCEDURE — 93005 ELECTROCARDIOGRAM TRACING: CPT

## 2023-01-05 PROCEDURE — 84443 ASSAY THYROID STIM HORMONE: CPT

## 2023-01-05 PROCEDURE — 96375 TX/PRO/DX INJ NEW DRUG ADDON: CPT

## 2023-01-05 RX ADMIN — SODIUM CHLORIDE, SODIUM LACTATE, POTASSIUM CHLORIDE, AND CALCIUM CHLORIDE PRN MLS/HR: 600; 310; 30; 20 INJECTION, SOLUTION INTRAVENOUS at 12:20

## 2023-01-05 NOTE — OPERATIVE REPORT
DATE OF SERVICE: 01/05/2023



ATTENDING PRIMARY CARE CLINICIAN:

Formerly [facility].



PREOPERATIVE DIAGNOSIS:

Chronic perforated appendicitis.



POSTOPERATIVE DIAGNOSES:

Chronic perforated appendicitis with dense adhesion tissue and iatrogenic injury

identified to the mid portion of the dome of the bladder.



PROCEDURE:

Laparoscopic appendectomy and cystorrhaphy.



SURGEON:

Josy Guo MD



ASSISTANT:

MELANIA Palomino



ANESTHESIA:

General endotracheal.



ESTIMATED BLOOD LOSS:

Minimal.



FINDINGS:

Chronic perforated appendicitis with dense adhesion tissue and iatrogenic injury

identified to the mid portion of the dome of the bladder.



DISPOSITION:

The patient tolerated the procedure well.



INDICATIONS:

The patient is a 72-year-old female who presented to [facility] emergency 

department with abdominal pain.  The patient does have a history of dementia; 

however, is very functional, especially with her  who is her primary 

caretaker.  She ambulates, eats and goes to the bathroom with some help.  She 

also lives at home with her .  The patient was found to have a perforated

appendicitis on CT scan and it was decided to proceed with percutaneous drainage

and IV antibiotics and she did well and was sent home.  She is now here for 

interval appendectomy.  She is otherwise doing well.  Her  reports she is

eating well and having normal bowel movements.  No systemic symptoms, no fever 

or chills.



DESCRIPTION OF PROCEDURE:

The patient was brought to the operating room, laid supine on the table.  After 

adequate IV pain and sedative medications and general endotracheal intubation, 

the abdomen was prepped and draped in standard surgical fashion.



0.5% Marcaine with epinephrine was used to anesthetize the overlying skin in the

left upper abdominal quadrant and a transverse skin incision made using a #15 

blade.  An 0 silk suture was applied to the medial aspect of the incision for 

traction.  A Veress needle inserted with a low opening pressure of 0 mmHg.  The 

abdomen was then insufflated to 15 mmHg pressure and the Veress needle was 

removed.  A 5 mm trocar placed followed by a 5 mm 45-degree angle laparoscope 

visualized in the peritoneal cavity.



A 4-quadrant abdominal exploration was performed.  There were omental adhesions 

towards the right lower abdominal quadrant as well as towards the abdominal 

wall.  Under direct visualization, we then proceeded to place an infraumbilical 

10 mm port after the skin and peritoneal lining were anesthetized using 0.5% 

Marcaine with epinephrine and a skin incision made using a #15 blade.  In a 

similar manner, a suprapubic 5 mm port was placed.



There was a significant amount of dense chronic inflammatory tissue, which we 

proceeded to dissect in a meticulous fashion.  This did take some time.  

Eventually, we were able to identify and dissect out the appendix.  Once this 

was completely freed, the appendix was retracted towards the anterior abdominal 

wall and a window created between the mesoappendix and the appendix at the cecal

base.  The appendix was then stapled and transected at the cecal base using a 

DARLYN 45 mm stapler with a 2.5 mm thickness load.  The mesoappendix was then 

stapled and transected with the same stapler with a 2.0 mm thickness reload with

visualization of good hemostasis.



After removal of the appendix, a thorough investigation was performed and it 

appeared that there was air within the bladder and an iatrogenic bladder injury 

was identified at the mid portion of the dome of the bladder.



We then proceeded with full thickness running suture using 2-0 Vicryl suture 

using the EndoStitch device, starting superiorly and inferiorly and tied in the 

middle.  We then proceeded with a second layer using the same suture in a 

similar manner.  A leak test was performed with methylene blue and water and a 

small leak was identified at the mid portion of the suture line and this was 

reinforced with a running 2-0 Vicryl suture.  Again, the leak test was performed

with no leak identified again using methylene blue through the bladder catheter.

 A 19-Armenian Rolando-Alberts drain was placed next to the bladder and the previous

appendix site and brought out the left upper abdominal quadrant 10 mm port and 

sutured to the skin using 3-0 Monocryl suture.  The 10 mm port fascia and 

peritoneum were then closed under direct visualization using a Raman-Poncho 

device and an 0 Vicryl suture.  The abdomen was desufflated and the remaining 

ports were removed.  All skin incisions were closed using 4-0 Monocryl running 

subcuticular sutures.  Wounds were then cleaned and covered with Dermabond.



The patient tolerated the procedure well.  We will instruct the  to keep 

the bladder catheter in for the next 2 weeks and then we will get a radiology 

cystogram and if there is no leak identified, we will then remove the bladder 

catheter.  The patient also does have a Rolando-Alberts drain and if there is 

minimal drainage when we see her in the office in 1 week, we will remove this 

drain. She may proceed with all her normal activities of daily living with no 

restrictions with diet and the only restriction being no heavy lifting or 

exertion for the next 2 weeks report.





Job ID: [id number]

DocumentID: [id number]

Dictated Date: 01/05/2023 15:37:07

Transcription Date: 01/05/2023 20:58:00

Dictated By: JOSY GUO MD

## 2023-01-05 NOTE — PROGRESS NOTE-POST OPERATIVE
Post-Operative Progess Note


Surgeon (s)/Assistant (s)


Surgeon


SHANI GUO MD


Assistant:  nandini hines APRLUL





Pre-Operative Diagnosis


chronic acute perforated appendicitis





Post-Operative Diagnosis





same. chronic adhesion tissure. bladder injury





Procedure & Operative Findings


Date of Procedure


1/5/23


Procedure Performed/Findings


laparoscopic appendectomy and cystorrhaphy.


Anesthesia Type


get





Estimated Blood Loss


Estimated blood loss (mL):  minimal





Specimens/Packing


Specimens Removed


appendix











SHANI GUO MD                 Jan 5, 2023 15:27

## 2023-01-05 NOTE — ANESTHESIA-GENERAL POST-OP
General


Patient Condition


Mental Status/LOC:  Same as Preop


Cardiovascular:  Satisfactory (Upon arrival to PACU patient in NSR. No 

documented history of AFIB. Approx 30min into recovery patient appears to have 

converted to AFIB, Dr Osborne updated. 12lead ekg ordered and deffered to surgeon. 

)


Nausea/Vomiting:  Absent


Respiratory:  Satisfactory


Pain:  Controlled


Complications:  Absent





Post Op Complications


Complications


None





Follow Up Care/Instructions


Patient Instructions


None needed.





Anesthesia/Patient Condition


Patient Condition


Patient is doing well, no complaints, stable vital signs, no apparent adverse 

anesthesia problems.   


No complications reported per nursing.











TIAGO REYES CRNA             Jan 5, 2023 16:14

## 2023-01-05 NOTE — DISCHARGE INST-SURGICAL
D/C Lap Instructions-SARI


New, Converted, or Re-Newed RX:  RX on Chart


Follow Up Appt in 2 weeks





Activity as tolerated


No driving for 24 hours


No driving while on pain medications





Incentive Spirometry use every 2 hours while awake





Regular Diet





Symptoms to Report: Fever over 101 degree F, Nausea/Vomiting 


Infection Signs and Symptoms to report:  Increased redness, Foul odor of wound, 

Increased drainage





Bathing instructions: May shower


Operative Area Clean/Dry;  Keep incision clean/dry





If any problems/questions: Contact your physician or go to Emergency Room











SHANI GUO MD                 Jan 5, 2023 12:00

## 2023-01-05 NOTE — PROGRESS NOTE-PRE OPERATIVE
Pre-Operative Progress Note


Date of Available H&P:  Jan 5, 2023


Date H&P Reviewed:  Jan 5, 2023


Time H&P Reviewed:  11:45


History & Physical:  No changes noted


Pre-Operative Diagnosis:  chronic acute perforated appendicitis











SHANI GUO MD                 Jan 5, 2023 11:57

## 2023-01-06 VITALS — DIASTOLIC BLOOD PRESSURE: 62 MMHG | SYSTOLIC BLOOD PRESSURE: 135 MMHG

## 2023-01-06 VITALS — DIASTOLIC BLOOD PRESSURE: 77 MMHG | SYSTOLIC BLOOD PRESSURE: 137 MMHG

## 2023-01-06 VITALS — SYSTOLIC BLOOD PRESSURE: 140 MMHG | DIASTOLIC BLOOD PRESSURE: 65 MMHG

## 2023-01-06 VITALS — SYSTOLIC BLOOD PRESSURE: 135 MMHG | DIASTOLIC BLOOD PRESSURE: 60 MMHG

## 2023-01-06 VITALS — DIASTOLIC BLOOD PRESSURE: 64 MMHG | SYSTOLIC BLOOD PRESSURE: 133 MMHG

## 2023-01-06 LAB
BASOPHILS # BLD AUTO: 0 10^3/UL (ref 0–0.1)
BASOPHILS NFR BLD AUTO: 0 % (ref 0–10)
BUN/CREAT SERPL: 28
CALCIUM SERPL-MCNC: 9.2 MG/DL (ref 8.5–10.1)
CHLORIDE SERPL-SCNC: 105 MMOL/L (ref 98–107)
CHOLEST SERPL-MCNC: 171 MG/DL (ref ?–200)
CO2 SERPL-SCNC: 25 MMOL/L (ref 21–32)
CREAT SERPL-MCNC: 0.88 MG/DL (ref 0.6–1.3)
EOSINOPHIL # BLD AUTO: 0.1 10^3/UL (ref 0–0.3)
EOSINOPHIL NFR BLD AUTO: 1 % (ref 0–10)
GFR SERPLBLD BASED ON 1.73 SQ M-ARVRAT: 69 ML/MIN
GLUCOSE SERPL-MCNC: 117 MG/DL (ref 70–105)
HCT VFR BLD CALC: 31 % (ref 35–52)
HDLC SERPL-MCNC: 52 MG/DL (ref 40–60)
HGB BLD-MCNC: 10.4 G/DL (ref 11.5–16)
LYMPHOCYTES # BLD AUTO: 1.5 10^3/UL (ref 1–4)
LYMPHOCYTES NFR BLD AUTO: 12 % (ref 12–44)
MAGNESIUM SERPL-MCNC: 1.8 MG/DL (ref 1.6–2.4)
MANUAL DIFFERENTIAL PERFORMED BLD QL: NO
MCH RBC QN AUTO: 31 PG (ref 25–34)
MCHC RBC AUTO-ENTMCNC: 34 G/DL (ref 32–36)
MCV RBC AUTO: 91 FL (ref 80–99)
MONOCYTES # BLD AUTO: 0.7 10^3/UL (ref 0–1)
MONOCYTES NFR BLD AUTO: 5 % (ref 0–12)
NEUTROPHILS # BLD AUTO: 10.6 10^3/UL (ref 1.8–7.8)
NEUTROPHILS NFR BLD AUTO: 82 % (ref 42–75)
PLATELET # BLD: 287 10^3/UL (ref 130–400)
PMV BLD AUTO: 8.9 FL (ref 9–12.2)
POTASSIUM SERPL-SCNC: 4.5 MMOL/L (ref 3.6–5)
SODIUM SERPL-SCNC: 136 MMOL/L (ref 135–145)
TRIGL SERPL-MCNC: 52 MG/DL (ref ?–150)
VLDLC SERPL CALC-MCNC: 10 MG/DL (ref 5–40)
WBC # BLD AUTO: 12.9 10^3/UL (ref 4.3–11)

## 2023-01-06 RX ADMIN — SODIUM CHLORIDE, SODIUM LACTATE, POTASSIUM CHLORIDE, AND CALCIUM CHLORIDE PRN MLS/HR: 600; 310; 30; 20 INJECTION, SOLUTION INTRAVENOUS at 01:59

## 2023-01-06 NOTE — CONSULTATION-CARDIOLOGY
HPI-Cardiology


Cardiology Consultation:


Date of Consultation


23


Date of Admission


23


Attending Physician


EvelinaLocal Physician


Admitting Physician


Admitting Physician:


Josy Osborne MD 








Attending Physician:


Josy Osborne MD


Consulting Physician


TAMMY RAMOS JR, MD





HPI:


Time Seen by a Provider:  16:05


Chief Complaint:





REASON FOR CONSULTATION: Atrial fibrillation.





I had the pleasure of seeing Niecy on the medical/surgical unit at [facility] in [place], [place] today.  I obtained the majority of the history from her 

 who is at her bedside.  The patient has severe dementia and cannot even 

tell me her name.  She had been in the hospital approximately 6 weeks ago with 

abdominal pain and was diagnosed with appendicitis at that time.  She was placed

on antibiotics and ultimately discharged home without having had an 

appendectomy.  Yesterday she came to the hospital for a "elective" appendectomy.

 Postoperatively she developed tachycardia and an electrocardiogram was 

performed that showed atrial fibrillation.  She was admitted to the medical 

floor and placed on telemetry.  Overnight, she converted back to a sinus rhythm.

 Her  states that she has no prior history of any cardiac history other 

than hypertension that was just recently diagnosed a few months ago but for he 

and his wife had moved from [place] to [place], [place].  She had been able to point to 

her abdomen when she was complaining of pain but at other times when he asked 

her where she has pain, she will point to her head or chest.  When she is in 

pain, sometimes she appears as though she might be slightly short of breath.  

From time to time, her  has noticed mild bilateral lower extremity edema.

 She has been taking a diuretic at home which helps with the peripheral edema. 

Due to her severe dementia, she has not reported any other specific cardiac com

plaints to her .  She is totally reliant on her  for her personal 

care.





Certain portions of this document may have been dictated utilizing voice 

recognition technology.  Inherent to this technology, typographical and 

grammatical errors may exist.  As much as I am diligent to identify and correct 

these mistakes, some errors may remain in the document.





Review of Systems-Cardiology


Review of Systems


Other comments


Review of systems is not obtainable due to the patient's severe dementia.





PMH-Social-Family Hx


Patient Social History


Marrital Status:  


Smoking Status:  Former Smoker





Immunizations Up To Date


Date of Influenza Vaccine:  Sep 14, 2022





Past Medical History


PMH


As described under Assessment.





Family Medical History


Family Medical History:  


One sister  from a heart attack.





Allergies and Home Medications


Allergies


Coded Allergies:  


     No Known Drug Allergies (Unverified , 22)





Patient Home Medication List


Home Medication List Reviewed:  Yes


Hydrocodone/Acetaminophen (Hydrocodone-Acetamin 5-325 mg) 5 Mg-325 Mg Tablet, 1 

TAB PO Q4H PRN for PAIN-MODERATE (5-7), (Reported)


   Entered as Reported by: THOR HARRIS on 22 1357


Lisinopril/Hydrochlorothiazide (Lisinopril-Hctz 20-12.5 mg Tab) 20 Mg-12.5 Mg 

Tablet, 1 EA PO BID, (Reported)


   Entered as Reported by: RICHARD MEZA on 22 1535


Spironolactone (Spironolactone) 25 Mg Tablet, 25 MG PO DAILY, (Reported)


   Entered as Reported by: RICHARD MEZA on 22 1535





Exam


Vital Signs





Vital Signs








  Date Time  Temp Pulse Resp B/P (MAP) Pulse Ox O2 Delivery O2 Flow Rate FiO2


 


23 15:57 37.0 80 18 137/77 (97) 97   


 


23 11:26      Room Air  


 


23 15:59       10.00 








Physical Exam


General: Alert but confused and being combative with her  trying to get 

out of bed.  Well nourished and appears stated age.


Eye: Extraocular movements are intact. Conjunctivae are clear. There are no 

xanthelasma.


HENT: Normocephalic. Atraumatic. Carotid pulsations 2/2 without bruits.


Neck: Jugular venous pressure does not appear elevated. No thyromegaly 

appreciated.


Respiratory: Lungs are clear to auscultation. Respirations are non-labored. 

Breath sounds are equal. Symmetrical chest wall expansion.


Cardiovascular: Normal rate. Regular rhythm.  2/6 holosystolic murmur. No 

gallop. Point of maximal impulse is not appear displaced. Good pulses equal in 

all extremities.  1+ bilateral pretibial edema.


Gastrointestinal: Soft. Normal bowel sounds.


Skin: Skin turgor is normal. There is no pallor.


Musculoskeletal: No kyphosis or scoliosis appreciated.


Neurologic: Alert but noncooperative and trying to get out of bed.  She did not 

answer any of my questions and just mumbled or spoke random words.  Cranial 

nerves 3-12 appear grossly intact. The patient has good motor tone strength in 

the upper and lower extremities bilaterally.


Psychiatric: Confused and not cooperative.


Labs





Laboratory Tests








Test


 23


07:56 Range/Units


 


 


White Blood Count


 12.9 H


 4.3-11.0


10^3/uL


 


Red Blood Count


 3.36 L


 3.80-5.11


10^6/uL


 


Hemoglobin 10.4 L 11.5-16.0  g/dL


 


Hematocrit 31 L 35-52  %


 


Mean Corpuscular Volume 91  80-99  fL


 


Mean Corpuscular Hemoglobin 31  25-34  pg


 


Mean Corpuscular Hemoglobin


Concent 34 


 32-36  g/dL





 


Red Cell Distribution Width 13.2  10.0-14.5  %


 


Platelet Count


 287 


 130-400


10^3/uL


 


Mean Platelet Volume 8.9 L 9.0-12.2  fL


 


Immature Granulocyte % (Auto) 0   %


 


Neutrophils (%) (Auto) 82 H 42-75  %


 


Lymphocytes (%) (Auto) 12  12-44  %


 


Monocytes (%) (Auto) 5  0-12  %


 


Eosinophils (%) (Auto) 1  0-10  %


 


Basophils (%) (Auto) 0  0-10  %


 


Neutrophils # (Auto)


 10.6 H


 1.8-7.8


10^3/uL


 


Lymphocytes # (Auto)


 1.5 


 1.0-4.0


10^3/uL


 


Monocytes # (Auto)


 0.7 


 0.0-1.0


10^3/uL


 


Eosinophils # (Auto)


 0.1 


 0.0-0.3


10^3/uL


 


Basophils # (Auto)


 0.0 


 0.0-0.1


10^3/uL


 


Immature Granulocyte # (Auto)


 0.1 


 0.0-0.1


10^3/uL


 


Sodium Level 136  135-145  MMOL/L


 


Potassium Level 4.5  3.6-5.0  MMOL/L


 


Chloride Level 105    MMOL/L


 


Carbon Dioxide Level 25  21-32  MMOL/L


 


Anion Gap 6  5-14  MMOL/L


 


Blood Urea Nitrogen 25 H 7-18  MG/DL


 


Creatinine


 0.88 


 0.60-1.30


MG/DL


 


Estimat Glomerular Filtration


Rate 69 


  





 


BUN/Creatinine Ratio 28   


 


Glucose Level 117 H   MG/DL


 


Calcium Level 9.2  8.5-10.1  MG/DL


 


Magnesium Level 1.8  1.6-2.4  MG/DL


 


Triglycerides Level 52  <150  MG/DL


 


Cholesterol Level 171  < 200  MG/DL


 


LDL Cholesterol Direct 102  1-129  MG/DL


 


VLDL Cholesterol 10  5-40  MG/DL


 


HDL Cholesterol 52  40-60  MG/DL


 


Thyroid Stimulating Hormone


(TSH) 0.57 


 0.35-4.94


UIU/ML








Radiology





ECHOCARDIOGRAM (2023):


1. This is a technically difficult study due to poor image quality secondary to 

poor acoustic windows.


2. Left ventricle: The cavity size is normal. Wall thickness is normal. Systolic

function is normal. The estimated ejection fraction is 60-65%. Regional wall 

motion abnormalities cannot be excluded due to poor endocardial definition. The 

left ventricular diastolic function is indeterminate.


3. Aortic valve: There is mild aortic valve sclerosis.


4. Pulmonary arteries: The pulmonary artery pressure cannot be estimated on this

study due to inadequate tricuspid regurgitant envelope.





ECG Impression


ECG


Comment


Electrocardiogram obtained on  at 17:05 showed atrial fibrillation with a 

ventricular rate of 104 bpm with borderline left axis deviation at -22.  

Electrocardiogram from this morning on  at 08:45 showed sinus rhythm with lef

t axis deviation.





Diagnosis/Problems


Diagnosis/Problems





(1) Paroxysmal atrial fibrillation


Status:  Acute


Assessment & Plan:  She had paroxysmal atrial fibrillation following an 

appendectomy.  This resolved spontaneously within 24 hours.  She has no prior 

history of atrial fibrillation as far as her  is aware.  Her 

echocardiogram showed normal biatrial size and no structural heart disease to 

explain atrial fibrillation.  Her TSH level is normal.  I suspect this was 

postoperative atrial fibrillation and does not necessarily require any kolby

tional treatment or anticoagulation.  I will plan to see her in the office in 2 

weeks and decide whether or not to have her undergo an outpatient event recorder

at that time.  Given her severe dementia, I do not think she would be a good 

candidate for oral anticoagulation if she has recurrent atrial fibrillation.





(2) Primary hypertension


Status:  Chronic


Assessment & Plan:  She has been normotensive despite not receiving her regular 

outpatient antihypertensive medication.  This will need to be followed after 

discharge.





(3) Severe dementia


Status:  Chronic


Assessment & Plan:  This would put her at risk for falls and would therefore i

ncrease her risk of potential hemorrhagic side effects from oral 

anticoagulation.














TAMMY RAMOS JR, MD          2023 15:24

## 2023-01-06 NOTE — DISCHARGE INST-SURGICAL
D/C Lap Instructions-SARI


New, Converted, or Re-Newed RX:  RX on Chart


Follow Up Appt in 1 week





Will need to KEEP LINARES IN next 2 WEEKS. drain bag at home when full.





TESS teaching. likely coming out at 1 week visit. 





Activity as tolerated





Incentive Spirometry use every 2 hours while awake





Regular Diet





Symptoms to Report: Fever over 101 degree F, Nausea/Vomiting 


Infection Signs and Symptoms to report:  Increased redness, Foul odor of wound, 

Increased drainage





Bathing instructions: May shower


Operative Area Clean/Dry;  Keep incision clean/dry





If any problems/questions: Contact your physician or go to Emergency Room











SHANI GUO MD                 Jan 6, 2023 11:26

## 2023-01-06 NOTE — PROGRESS NOTE
Subjective


Date Seen by a Provider:  Jan 6, 2023


Time Seen by a Provider:  11:00


Subjective/Events-last exam


doing well. does have some abd pain. clear yellow urine small. minimal SS TESS 

drain.





Objective


Exam





Vital Signs








  Date Time  Temp Pulse Resp B/P (MAP) Pulse Ox O2 Delivery O2 Flow Rate FiO2


 


1/6/23 08:00      Room Air  


 


1/6/23 07:43 36.3 77 18 135/62 (86) 96 Room Air  


 


1/6/23 03:50 36.2 83 18 135/60 (85) 95 Room Air  


 


1/6/23 01:00  72      


 


1/6/23 00:14 36.3 82 18 140/65 (90) 96 Room Air  


 


1/5/23 20:50      Room Air  


 


1/5/23 20:06  71      


 


1/5/23 19:17 36.0 80 18 140/65 (90) 96 Room Air  


 


1/5/23 19:15 36.0 80 18 140/65 (90) 96 Room Air  


 


1/5/23 17:00      Room Air  


 


1/5/23 17:00 35.9 98 18 177/80 (112) 94   


 


1/5/23 16:50      Room Air  


 


1/5/23 16:49   14 169/94 (119) 96 Room Air  


 


1/5/23 16:39   12 173/83 (113) 97 Room Air  


 


1/5/23 16:35      Room Air  


 


1/5/23 16:29 36.1  13 155/81 (105) 96 Room Air  


 


1/5/23 16:20      Room Air  


 


1/5/23 16:19   14 151/69 (96) 95 Room Air  


 


1/5/23 16:09   16 146/73 (97) 98 Room Air  


 


1/5/23 16:05      Room Air  


 


1/5/23 15:59   15 164/80 (108) 100 OxyMask 10.00 


 


1/5/23 15:50      OxyMask 10.00 


 


1/5/23 15:49   16 168/71 (103) 100 OxyMask 10.00 


 


1/5/23 15:39   18 164/75 (104) 100 OxyMask 10.00 


 


1/5/23 15:35      OxyMask 10.00 


 


1/5/23 15:29   15 155/64 (94) 100 OxyMask 10.00 


 


1/5/23 15:19      OxyMask 10.00 


 


1/5/23 15:19 36.1  22 152/64 (93) 99 OxyMask 10.00 


 


1/5/23 12:37 37.0 72 20 148/86 (106) 100 Room Air  











l


I & O 


 


 1/6/23





 07:00


 


Intake Total 50 ml


 


Output Total 1650 ml


 


Balance -1600 ml





Capillary Refill :


General Appearance:  No Apparent Distress


HEENT:  PERRL/EOMI


Neck:  Full Range of Motion


Respiratory:  Chest Non Tender


Cardiovascular:  Regular Rate, Rhythm


Gastrointestinal:  soft, tenderness, other (wounds clean/ddry)


Extremity:  Normal Capillary Refill


Neurologic/Psychiatric:  Alert, Oriented x3


Skin:  Normal Color


Lymphatic:  No Adenopathy





Results


Lab


Laboratory Tests


1/6/23 07:56: 


White Blood Count 12.9H, Red Blood Count 3.36L, Hemoglobin 10.4L, Hematocrit 31L

, Mean Corpuscular Volume 91, Mean Corpuscular Hemoglobin 31, Mean Corpuscular 

Hemoglobin Concent 34, Red Cell Distribution Width 13.2, Platelet Count 287, 

Mean Platelet Volume 8.9L, Immature Granulocyte % (Auto) 0, Neutrophils (%) 

(Auto) 82H, Lymphocytes (%) (Auto) 12, Monocytes (%) (Auto) 5, Eosinophils (%) 

(Auto) 1, Basophils (%) (Auto) 0, Neutrophils # (Auto) 10.6H, Lymphocytes # 

(Auto) 1.5, Monocytes # (Auto) 0.7, Eosinophils # (Auto) 0.1, Basophils # (Auto)

0.0, Immature Granulocyte # (Auto) 0.1, Sodium Level 136, Potassium Level 4.5, 

Chloride Level 105, Carbon Dioxide Level 25, Anion Gap 6, Blood Urea Nitrogen 

25H, Creatinine 0.88, Estimat Glomerular Filtration Rate 69, BUN/Creatinine 

Ratio 28, Glucose Level 117H, Calcium Level 9.2, Magnesium Level 1.8, 

Triglycerides Level 52, Cholesterol Level 171, LDL Cholesterol Direct 102, VLDL 

Cholesterol 10, HDL Cholesterol 52, Thyroid Stimulating Hormone (TSH) 0.57





Assessment/Plan


Assessment/Plan


Assess & Plan/Chief Complaint


chronic appendicitis s/p interval appendectomy and bladder repair with new onset

a-fib.


start clears and advance as tammy.


ambulate.


will need small in for 2 weeks then cystogram and if no leak then removal. 


f/u in office 1 week for TESS drain removal.











SHANI GUO MD                 Jan 6, 2023 11:24

## 2023-01-19 ENCOUNTER — HOSPITAL ENCOUNTER (OUTPATIENT)
Dept: HOSPITAL 75 - RAD | Age: 73
End: 2023-01-19
Attending: SURGERY
Payer: MEDICARE
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
## 2023-01-19 DIAGNOSIS — S37.29XA: Primary | ICD-10-CM

## 2023-01-19 PROCEDURE — 72192 CT PELVIS W/O DYE: CPT

## 2023-01-19 NOTE — DIAGNOSTIC IMAGING REPORT
INDICATION: Questionable bladder injury.



TECHNIQUE: A mixture of water and Omnipaque 350 contrast was

inserted into the urinary bladder through a Dugan catheter under

gravity. Axial imaging through the pelvis was then performed.

Precontrast imaging was also performed. Postdrainage imaging was

performed as well.



FINDINGS: There is a focal defect in the right aspect of the

urinary bladder anteriorly and near the dome. Defect appears to

be approximately 1 cm in diameter in the transverse dimension.

Contrast does extend through the defect anteriorly and to the

right paramidline into a small cavity measuring approximately 31

mm x 17 mm. No other defect is seen within the bladder wall.

Postdrainage imaging does show complete evacuation of contrast

from the urinary bladder as well as from the contrast cavity.

Moderate stool in the rectum is seen. There is no free fluid

detected.



IMPRESSION: Bladder wall defect anterior and to the right near

the dome, as described. Contrast does extend through the defect

into a small cavity. 



Dictated by: 



  Dictated on workstation # [id number]
12-Dec-2021 15:40

## 2023-01-20 ENCOUNTER — HOSPITAL ENCOUNTER (EMERGENCY)
Dept: HOSPITAL 75 - ER | Age: 73
Discharge: HOME | End: 2023-01-20
Payer: MEDICARE

## 2023-01-20 VITALS — SYSTOLIC BLOOD PRESSURE: 168 MMHG | DIASTOLIC BLOOD PRESSURE: 97 MMHG

## 2023-01-20 VITALS — WEIGHT: 145.06 LBS | HEIGHT: 63.78 IN | BODY MASS INDEX: 25.07 KG/M2

## 2023-01-20 DIAGNOSIS — Z90.49: ICD-10-CM

## 2023-01-20 DIAGNOSIS — Z87.19: ICD-10-CM

## 2023-01-20 DIAGNOSIS — Z28.310: ICD-10-CM

## 2023-01-20 DIAGNOSIS — Z46.6: Primary | ICD-10-CM

## 2023-01-20 DIAGNOSIS — Z87.891: ICD-10-CM

## 2023-01-20 LAB
APTT PPP: YELLOW S
BACTERIA #/AREA URNS HPF: (no result) /HPF
BILIRUB UR QL STRIP: NEGATIVE
FIBRINOGEN PPP-MCNC: (no result) MG/DL
GLUCOSE UR STRIP-MCNC: NEGATIVE MG/DL
KETONES UR QL STRIP: NEGATIVE
LEUKOCYTE ESTERASE UR QL STRIP: (no result)
NITRITE UR QL STRIP: NEGATIVE
PH UR STRIP: 6 [PH] (ref 5–9)
PROT UR QL STRIP: (no result)
SP GR UR STRIP: 1.02 (ref 1.02–1.02)
SQUAMOUS #/AREA URNS HPF: (no result) /HPF
WBC #/AREA URNS HPF: (no result) /HPF

## 2023-01-20 PROCEDURE — 87186 SC STD MICRODIL/AGAR DIL: CPT

## 2023-01-20 PROCEDURE — 51702 INSERT TEMP BLADDER CATH: CPT

## 2023-01-20 PROCEDURE — 81000 URINALYSIS NONAUTO W/SCOPE: CPT

## 2023-01-20 PROCEDURE — 87088 URINE BACTERIA CULTURE: CPT

## 2023-01-20 PROCEDURE — 87077 CULTURE AEROBIC IDENTIFY: CPT
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
## 2023-02-02 ENCOUNTER — HOSPITAL ENCOUNTER (OUTPATIENT)
Dept: HOSPITAL 75 - RAD | Age: 73
End: 2023-02-02
Attending: SURGERY
Payer: MEDICARE
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
## 2023-02-02 DIAGNOSIS — S37.20XD: Primary | ICD-10-CM

## 2023-02-02 PROCEDURE — 72192 CT PELVIS W/O DYE: CPT

## 2023-02-02 NOTE — DIAGNOSTIC IMAGING REPORT
INDICATION: Followup bladder injury.



120 mL mixture of water and Omnipaque contrast was instilled into

the bladder through the patient's indwelling Dugan catheter. Pre

and postcontrast imaging through the pelvis was then performed.



Previously noted defect in the right aspect of the bladder dome

is again noted and and similar to prior study. Contrast extends

through the defect and fills a small cavity measuring 33 mm x 19

mm. No other bladder defects are seen. Post void films are

unremarkable. Small bowel loops are unremarkable. No free fluid.



IMPRESSION: No significant change in the bladder wall defect

anteriorly and to the right near the dome when compared with

prior CT from 01/19/2023.



Dictated by: 



  Dictated on workstation # [id number]

## 2023-02-15 ENCOUNTER — HOSPITAL ENCOUNTER (EMERGENCY)
Dept: HOSPITAL 75 - ER | Age: 73
Discharge: HOME | End: 2023-02-15
Payer: MEDICARE

## 2023-02-15 VITALS — HEIGHT: 66.93 IN | BODY MASS INDEX: 21.45 KG/M2 | WEIGHT: 136.69 LBS

## 2023-02-15 VITALS — SYSTOLIC BLOOD PRESSURE: 170 MMHG | DIASTOLIC BLOOD PRESSURE: 132 MMHG
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
## 2023-02-15 DIAGNOSIS — Z87.891: ICD-10-CM

## 2023-02-15 DIAGNOSIS — T83.098A: Primary | ICD-10-CM

## 2023-02-15 DIAGNOSIS — N39.0: ICD-10-CM

## 2023-02-15 LAB
APTT PPP: YELLOW S
BACTERIA #/AREA URNS HPF: (no result) /HPF
BILIRUB UR QL STRIP: NEGATIVE
FIBRINOGEN PPP-MCNC: CLEAR MG/DL
GLUCOSE UR STRIP-MCNC: NEGATIVE MG/DL
KETONES UR QL STRIP: NEGATIVE
LEUKOCYTE ESTERASE UR QL STRIP: (no result)
NITRITE UR QL STRIP: POSITIVE
PH UR STRIP: 6 [PH] (ref 5–9)
PROT UR QL STRIP: NEGATIVE
RBC #/AREA URNS HPF: (no result) /HPF
RENAL EPI CELLS #/AREA URNS HPF: (no result) /HPF
SP GR UR STRIP: <=1.005 (ref 1.02–1.02)
SQUAMOUS #/AREA URNS HPF: (no result) /HPF

## 2023-02-15 PROCEDURE — 81000 URINALYSIS NONAUTO W/SCOPE: CPT

## 2023-02-15 PROCEDURE — 99282 EMERGENCY DEPT VISIT SF MDM: CPT

## 2023-02-15 PROCEDURE — 87077 CULTURE AEROBIC IDENTIFY: CPT

## 2023-02-15 PROCEDURE — 87088 URINE BACTERIA CULTURE: CPT
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
## 2023-02-22 ENCOUNTER — HOSPITAL ENCOUNTER (OUTPATIENT)
Dept: HOSPITAL 75 - RAD | Age: 73
End: 2023-02-22
Attending: SURGERY
Payer: MEDICARE
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
## 2023-02-22 DIAGNOSIS — S37.29XD: Primary | ICD-10-CM

## 2023-02-22 PROCEDURE — 72192 CT PELVIS W/O DYE: CPT

## 2023-02-22 NOTE — DIAGNOSTIC IMAGING REPORT
EXAMINATION: CT pelvis cystogram without intravenous contrast.



TECHNIQUE: Multiple contiguous axial images were obtained through

the pelvis without intravenous contrast. 110 mL of Omnipaque and

water or injected in a retrograde fashion into the urinary

bladder via Dugan catheter. All CT scans use one or more of the

following dose optimizing techniques: automated exposure control,

MA and/or KvP adjustment based on patient size and exam type or

iterative reconstruction. 



HISTORY: Followup bladder injury.



COMPARISON: 02/02/2023.



FINDINGS:



Defect is again seen in the anterior aspect of the dome of the

bladder right of midline measuring 3.2 x 2.4 cm and 2.8 cm

craniocaudal. No extravasation of contrast is seen into the

peritoneal cavity. No new defects are seen. Dugan catheter is in

place. The bladder demonstrates appropriate drainage with minimal

residual contrast within the defect and trigone of the urinary

bladder.



No bowel obstruction. No free fluid or free air. No acute osseous

abnormalities in the pelvis.



IMPRESSION:

1. Essentially stable defect in the dome of the urinary bladder

right of midline. No evidence of extravasation of contrast or new

injury to the urinary bladder. Recommend continued followup as

indicated.



Dictated by: 



  Dictated on workstation # [id number]